# Patient Record
Sex: FEMALE | Race: WHITE | Employment: FULL TIME | URBAN - METROPOLITAN AREA
[De-identification: names, ages, dates, MRNs, and addresses within clinical notes are randomized per-mention and may not be internally consistent; named-entity substitution may affect disease eponyms.]

---

## 2016-08-31 LAB — TYPE, ABO & RH, EXTERNAL: NORMAL

## 2016-09-22 LAB
HBSAG, EXTERNAL: NORMAL
HIV, EXTERNAL: NORMAL
RPR, EXTERNAL: NORMAL
RUBELLA, EXTERNAL: NORMAL

## 2016-10-20 LAB
CHLAMYDIA, EXTERNAL: NORMAL
N. GONORRHEA, EXTERNAL: NORMAL

## 2017-01-10 ENCOUNTER — TELEPHONE (OUTPATIENT)
Dept: ENDOCRINOLOGY | Age: 32
End: 2017-01-10

## 2017-01-10 NOTE — TELEPHONE ENCOUNTER
----- Message from Maggie Russo sent at 1/10/2017  1:21 PM EST -----  Regarding: phone call  Patient called to ask a question about coming in to have her A1c checked. She would like you to call her back at:   [phone]. Thanks Linn Grider. ----    Pt called stating that she has two doctor's appt prior to her office visit with us on the same day and she wanted to know if she was having her A1c checked in our office. I informed her that it would be checked at her appt. during the visit. She  stated she was trying to see if she could come in that morning to have the results for her other doctor's scheduled that morning. I informed her that we could put her in as a lab visit appt. Pt changed her mind and stated that she would wait to do it all at the same time.

## 2017-01-26 ENCOUNTER — OFFICE VISIT (OUTPATIENT)
Dept: ENDOCRINOLOGY | Age: 32
End: 2017-01-26

## 2017-01-26 VITALS
BODY MASS INDEX: 26.91 KG/M2 | WEIGHT: 146.2 LBS | SYSTOLIC BLOOD PRESSURE: 102 MMHG | HEIGHT: 62 IN | DIASTOLIC BLOOD PRESSURE: 62 MMHG | HEART RATE: 90 BPM

## 2017-01-26 DIAGNOSIS — E78.5 HYPERLIPIDEMIA LDL GOAL <100: ICD-10-CM

## 2017-01-26 LAB — HBA1C MFR BLD HPLC: 6.8 %

## 2017-01-26 RX ORDER — INSULIN LISPRO 100 [IU]/ML
INJECTION, SOLUTION INTRAVENOUS; SUBCUTANEOUS
Qty: 1 PACKAGE | COMMUNITY
Start: 2017-01-26 | End: 2017-03-23 | Stop reason: SDUPTHER

## 2017-01-26 NOTE — PROGRESS NOTES
Chief Complaint   Patient presents with    Diabetes     pcp and pharmacy confirmed     History of Present Illness: Sharad Baltazar is a 36 y.o. female here for follow up of diabetes. Weight up 3 lbs since last visit in 12/16. Will be 25 weeks tomorrow with a boy. Had an ultrasound today with Dr. Yousif Hassan and said everything looks fine. She continues to e-mail me her readings weekly and she has increased her toujeo up to 46 units as of yesterday. The majority of her readings are still in the  range but she does have some spikes in the 150-210 range especially when eating larger carb meals. We discussed that she should try not to eat more than 60 grams of carbs with a meal to try and limit the amount of insulin she needs to prevent the baby from growing too large. I also advised her to stop eating a honey bun at bedtime and trying to take humalog for this as this is keeping her fasting sugars higher. Current Outpatient Prescriptions   Medication Sig    CALCIUM PO Take  by mouth daily.  insulin glargine (TOUJEO SOLOSTAR) 300 unit/mL (1.5 mL) inpn 46 Units by SubCUTAneous route daily. Dose change 1/16/17--updated med list--did not send prescription to the pharmacy    PNV95/FERROUS FUMARATE/FA (PRENATAL PO) Take  by mouth.  Insulin Needles, Disposable, (BD INSULIN PEN NEEDLE UF MINI) 31 gauge x 3/16\" ndle Use as directed 4 times daily    ONETOUCH ULTRA TEST strip Test 4 times daily  Dx: E10.65    ONETOUCH ULTRAMINI monitoring kit Use as directed: E10.65    insulin lispro (HUMALOG) 100 unit/mL kwikpen by SubCUTAneous route. 1 units per 5 grams of carbs for breakfast and lunch and dinner + 1 units for every 50 mg/dl above 130 mg/dl--Dose change 1/11/17--updated med list--did not send prescription to the pharmacy     No current facility-administered medications for this visit.       Allergies   Allergen Reactions    Ciprofloxacin Hives     Review of Systems:  - Eyes: no blurry vision or double vision  - Cardiovascular: no chest pain  - Respiratory: no shortness of breath  - Musculoskeletal: no myalgias  - Neurological: no numbness/tingling in extremities    Physical Examination:  Blood pressure 102/62, pulse 90, height 5' 2\" (1.575 m), weight 146 lb 3.2 oz (66.3 kg). - General: pleasant, no distress, good eye contact   - Neck: no carotid bruits  - Cardiovascular: regular, normal rate, nl s1 and s2, no m/r/g,   - Respiratory: clear bilaterally  - Integumentary: no edema,   - Psychiatric: normal mood and affect    Data Reviewed:   Component      Latest Ref Rng & Units 1/26/2017           2:52 PM   Hemoglobin A1c (POC)      % 6.8       Assessment/Plan:     1. Type I (juvenile type) diabetes mellitus without mention of complication, uncontrolled ([facility]): her most recent Hgb A1c was 6.8% in 1/17 up from 6.6% in 12/16 down from 6.8% in 11/16 down from 7.5% in 10/16 down from 7.9% in 9/16 up from 7.6% in 8/16 down from 8.3% in 3/16 up from 7.8% in 10/15 down from 8.2% in 6/15 up from 6.8% in 3/15. We spent 15 minutes of face to face time together and > 50% of the time was spent in counseling on diabetes management and determining what changes to make to her insulin regimen. Blood sugars are running higher so will be more aggressive with her carb dosing and her correction. - cont toujeo 46 units daily  - increase humalog to 1:4 for carbs and 1:40 > 130 for correction during the day and > 150 at bedtime  - check bs 4 times per day due to fluctuating sugars  - foot exam done 8/16  - optho UTD 6/15  - TSH normal 6/15  - microalbumin nl 8/16  - check A1c at next visit  - her BP was at goal < 140/90        2. Hyperlipidemia: Given DM, Goal LDL < 100, non-HDL < 130, and TG < 150.  in 6/15 down to 129 in 3/16. Will not pursue any meds while trying to conceive  - diet control  - check lipids when she is no longer pregnant    Patient Instructions   1) Stay on 55 of toujeo for now.     2) Dose your humalog at 1 unit for 4 grams of carbs per meal.  Try not to eat more than 60 grams per meal.    3) If your sugar is over 130, take 1 unit for 40 points over 130 to correct. 4) Keep sending me readings. 5) Your Hemoglobin A1c (3 month test of blood sugar) is still quite good at 6.8%.           Follow-up Disposition:  Return for 2/23/17 at 2:30pm.    Copy sent to:  Dr. Melanie Acevedo

## 2017-01-26 NOTE — MR AVS SNAPSHOT
Visit Information Date & Time Provider Department Dept. Phone Encounter #  
 1/26/2017  2:30 PM Colvin Gaucher, [address] [place] [address] Diabetes and Endocrinology [phone] [id number] Follow-up Instructions Return for 2/23/17 at 2:30pm. Upcoming Health Maintenance Date Due Pneumococcal 19-64 Medium Risk (1 of 1 - PPSV23) 8/10/2006 DTaP/Tdap/Td series (1 - Tdap) 8/10/2008 PAP AKA CERVICAL CYTOLOGY 8/10/2008 INFLUENZA AGE 9 TO ADULT 8/1/2016 EYE EXAM RETINAL OR DILATED Q1 2/27/2017 LIPID PANEL Q1 3/17/2017 HEMOGLOBIN A1C Q6M 6/27/2017 MICROALBUMIN Q1 8/25/2017 FOOT EXAM Q1 8/29/2017 Allergies as of 1/26/2017  Review Complete On: 1/26/2017 By: Colvin Gaucher, MD  
  
 Severity Noted Reaction Type Reactions Ciprofloxacin  06/22/2015    Hives Current Immunizations  Never Reviewed No immunizations on file. Not reviewed this visit You Were Diagnosed With   
  
 Codes Comments Uncontrolled type 1 diabetes mellitus without complication (HCC)    -  Primary ICD-10-CM: E10.9 ICD-9-CM: 250.03 Hyperlipidemia LDL goal <100     ICD-10-CM: E78.5 ICD-9-CM: 272.4 Vitals BP Pulse Height(growth percentile) Weight(growth percentile) BMI Smoking Status 102/62 (BP 1 Location: Left arm, BP Patient Position: Sitting) 90 5' 2\" (1.575 m) 146 lb 3.2 oz (66.3 kg) 26.74 kg/m2 Never Smoker Vitals History BMI and BSA Data Body Mass Index Body Surface Area  
 26.74 kg/m 2 1.7 m 2 Preferred Pharmacy Pharmacy Name Phone [id number] [place] [phone] Your Updated Medication List  
  
   
This list is accurate as of: 1/26/17  3:20 PM.  Always use your most recent med list.  
  
  
  
  
 CALCIUM PO Take  by mouth daily. insulin lispro 100 unit/mL kwikpen Commonly known as:  HUMALOG 1 units per 4 grams of carbs for breakfast and lunch and dinner + 1 units for every 40 mg/dl above 130 mg/dl--Dose change 1/26/17--updated med list--did not send prescription to the pharmacy Insulin Needles (Disposable) 31 gauge x 3/16\" Ndle Commonly known as:  BD INSULIN PEN NEEDLE UF MINI Use as directed 4 times daily ONETOUCH ULTRA TEST strip Generic drug:  glucose blood VI test strips Test 4 times daily  Dx: E10.65 ONETOUCH ULTRAMINI monitoring kit Generic drug:  Blood-Glucose Meter Use as directed: E10.65 PRENATAL PO Take  by mouth. TOUJEO SOLOSTAR 300 unit/mL (1.5 mL) Inpn Generic drug:  insulin glargine 46 Units by SubCUTAneous route daily. Dose change 1/16/17--updated med list--did not send prescription to the pharmacy We Performed the Following AMB POC HEMOGLOBIN A1C [89296 CPT(R)] Follow-up Instructions Return for 2/23/17 at 2:30pm.  
  
  
Patient Instructions 1) Stay on 55 of toujeo for now. 2) Dose your humalog at 1 unit for 4 grams of carbs per meal.  Try not to eat more than 60 grams per meal. 
 
3) If your sugar is over 130, take 1 unit for 40 points over 130 to correct. 4) Keep sending me readings. 5) Your Hemoglobin A1c (3 month test of blood sugar) is still quite good at 6.8%. Introducing [facility] & HEALTH SERVICES! Dear Shiloh Pro: Thank you for requesting a ROCKI account. Our records indicate that you already have an active ROCKI account. You can access your account anytime at [email or web address] Did you know that you can access your hospital and ER discharge instructions at any time in ROCKI? You can also review all of your test results from your hospital stay or ER visit. Additional Information If you have questions, please visit the Frequently Asked Questions section of the ROCKI website at [email or web address] Remember, MyChart is NOT to be used for urgent needs. For medical emergencies, dial 911. Now available from your iPhone and Android! Please provide this summary of care documentation to your next provider. Your primary care clinician is listed as [address] [place] [address]. If you have any questions after today's visit, please call [phone].

## 2017-01-26 NOTE — PATIENT INSTRUCTIONS
1) Stay on 55 of toujeo for now. 2) Dose your humalog at 1 unit for 4 grams of carbs per meal.  Try not to eat more than 60 grams per meal.    3) If your sugar is over 130, take 1 unit for 40 points over 130 to correct. 4) Keep sending me readings. 5) Your Hemoglobin A1c (3 month test of blood sugar) is still quite good at 6.8%.

## 2017-02-06 ENCOUNTER — TELEPHONE (OUTPATIENT)
Dept: ENDOCRINOLOGY | Age: 32
End: 2017-02-06

## 2017-02-06 NOTE — TELEPHONE ENCOUNTER
----- Message from Álvaro Mcmullen sent at 2/6/2017  2:32 PM EST -----  Rescheduled patient's appointment to 10:30 AM on 2/23/17. Thank you so much.      ----- Message -----     From: Colvin Gaucher, MD     Sent: 2/6/2017   1:09 PM       To: Álvaro Mcmullen    Please reschedule her appt from 2:30pm to 10:30am on 2/23/17. She has confirmed over [employer].

## 2017-02-23 ENCOUNTER — OFFICE VISIT (OUTPATIENT)
Dept: ENDOCRINOLOGY | Age: 32
End: 2017-02-23

## 2017-02-23 VITALS
BODY MASS INDEX: 27.86 KG/M2 | HEART RATE: 99 BPM | SYSTOLIC BLOOD PRESSURE: 98 MMHG | WEIGHT: 151.4 LBS | DIASTOLIC BLOOD PRESSURE: 67 MMHG | HEIGHT: 62 IN

## 2017-02-23 DIAGNOSIS — E78.5 HYPERLIPIDEMIA LDL GOAL <100: ICD-10-CM

## 2017-02-23 LAB — HBA1C MFR BLD HPLC: 6.8 %

## 2017-02-23 NOTE — PROGRESS NOTES
Chief Complaint   Patient presents with    Diabetes     pcp and pharmacy confirmed     History of Present Illness: John Griffin is a 36 y.o. female here for follow up of diabetes. Weight up 5 lbs since last visit in 1/17. Has been working on her total carb intake and trying to stick to 60 grams or less per meal but doesn't always do this and this is when she still spikes in the 130-180 range. Some weeks this can happen 5-6 times which is still keeping her sugars higher than I would like. Went to 48 units of toujeo last week and this is keeping her fasting sugars close to 100 or less. When watching carbs closely can keep her readings between  during the day. Currently 29 weeks pregnant with a boy. Current Outpatient Prescriptions   Medication Sig    insulin glargine (TOUJEO SOLOSTAR) 300 unit/mL (1.5 mL) inpn 48 Units by SubCUTAneous route daily. And Increase as directed up to a max of 70 units per day--Dose change 2/14/17--updated med list--did not send prescription to the pharmacy    CALCIUM PO Take  by mouth daily.  insulin lispro (HUMALOG) 100 unit/mL kwikpen 1 units per 4 grams of carbs for breakfast and lunch and dinner + 1 units for every 40 mg/dl above 130 mg/dl--Dose change 1/26/17--updated med list--did not send prescription to the pharmacy    PNV95/FERROUS FUMARATE/FA (PRENATAL PO) Take  by mouth.  Insulin Needles, Disposable, (BD INSULIN PEN NEEDLE UF MINI) 31 gauge x 3/16\" ndle Use as directed 4 times daily    ONETOUCH ULTRA TEST strip Test 4 times daily  Dx: E10.65    ONETOUCH ULTRAMINI monitoring kit Use as directed: E10.65     No current facility-administered medications for this visit.       Allergies   Allergen Reactions    Ciprofloxacin Hives     Review of Systems:  - Eyes: no blurry vision or double vision  - Cardiovascular: no chest pain  - Respiratory: no shortness of breath  - Musculoskeletal: no myalgias  - Neurological: no numbness/tingling in extremities    Physical Examination:  Blood pressure 98/67, pulse 99, height 5' 2\" (1.575 m), weight 151 lb 6.4 oz (68.7 kg). - General: pleasant, no distress, good eye contact   - Neck: no carotid bruits  - Cardiovascular: regular, normal rate, nl s1 and s2, no m/r/g,   - Respiratory: clear bilaterally  - Integumentary: no edema,   - Psychiatric: normal mood and affect    Data Reviewed:   Component      Latest Ref Rng & Units 2/23/2017          10:56 AM   Hemoglobin A1c (POC)      % 6.8       Assessment/Plan:     1. Type I (juvenile type) diabetes mellitus without mention of complication, uncontrolled ([facility] [place]): her most recent Hgb A1c was 6.8% in 2/17 stable from 1/17 up from 6.6% in 12/16 down from 6.8% in 11/16 down from 7.5% in 10/16 down from 7.9% in 9/16 up from 7.6% in 8/16 down from 8.3% in 3/16 up from 7.8% in 10/15 down from 8.2% in 6/15 up from 6.8% in 3/15. We spent 15 minutes of face to face time together and > 50% of the time was spent in counseling on diabetes management and determining what changes to make to her insulin regimen. Blood sugars are stable so won't make any changes today but likely will need an increase in her toujeo in the coming weeks. - cont toujeo 48 units daily  - cont humalog 1:4 for carbs and 1:40 > 130 for correction during the day and > 150 at bedtime  - check bs 4 times per day due to fluctuating sugars  - foot exam done 8/16  - optho UTD 6/15  - TSH normal 6/15  - microalbumin nl 8/16  - check A1c at next visit  - her BP was at goal < 140/90        2. Hyperlipidemia: Given DM, Goal LDL < 100, non-HDL < 130, and TG < 150.  in 6/15 down to 129 in 3/16. Will not pursue any meds while trying to conceive  - diet control  - check lipids when she is no longer pregnant    Patient Instructions   1) Your Hemoglobin A1c (3 month test of blood sugar) is still very good at 6.8%. Keep doing your best to keep your carb intake to 60 grams or less.   If you are seeing spikes after certain meals, think about the possibility of undercounting your carbs.         Follow-up Disposition:  Return for 3/23/17 at 10:30am.    Copy sent to:  Dr. Wander Holley Settler  Dr. Eddie Johnson

## 2017-02-23 NOTE — PATIENT INSTRUCTIONS
1) Your Hemoglobin A1c (3 month test of blood sugar) is still very good at 6.8%. Keep doing your best to keep your carb intake to 60 grams or less. If you are seeing spikes after certain meals, think about the possibility of undercounting your carbs.

## 2017-02-23 NOTE — MR AVS SNAPSHOT
Visit Information Date & Time Provider Department Dept. Phone Encounter #  
 2/23/2017 10:30 AM Aleksey Steel, [address] [place] [address] Diabetes and Endocrinology [phone] [id number] Follow-up Instructions Return for 3/23/17 at 10:30am. Upcoming Health Maintenance Date Due Pneumococcal 19-64 Medium Risk (1 of 1 - PPSV23) 8/10/2006 DTaP/Tdap/Td series (1 - Tdap) 8/10/2008 PAP AKA CERVICAL CYTOLOGY 8/10/2008 INFLUENZA AGE 9 TO ADULT 8/1/2016 EYE EXAM RETINAL OR DILATED Q1 2/27/2017 LIPID PANEL Q1 3/17/2017 HEMOGLOBIN A1C Q6M 7/26/2017 MICROALBUMIN Q1 8/25/2017 FOOT EXAM Q1 8/29/2017 Allergies as of 2/23/2017  Review Complete On: 2/23/2017 By: Aleksey Steel MD  
  
 Severity Noted Reaction Type Reactions Ciprofloxacin  06/22/2015    Hives Current Immunizations  Never Reviewed No immunizations on file. Not reviewed this visit You Were Diagnosed With   
  
 Codes Comments Uncontrolled type 1 diabetes mellitus without complication (HCC)    -  Primary ICD-10-CM: E10.9 ICD-9-CM: 250.03 Hyperlipidemia LDL goal <100     ICD-10-CM: E78.5 ICD-9-CM: 272.4 Vitals BP  
  
  
  
  
  
 98/67 (BP 1 Location: Left arm, BP Patient Position: Sitting) Vitals History BMI and BSA Data Body Mass Index Body Surface Area  
 27.69 kg/m 2 1.73 m 2 Preferred Pharmacy Pharmacy Name Phone [id number] [address] [phone] Your Updated Medication List  
  
   
This list is accurate as of: 2/23/17 11:11 AM.  Always use your most recent med list.  
  
  
  
  
 CALCIUM PO Take  by mouth daily. insulin lispro 100 unit/mL kwikpen Commonly known as:  HUMALOG  
1 units per 4 grams of carbs for breakfast and lunch and dinner + 1 units for every 40 mg/dl above 130 mg/dl--Dose change 1/26/17--updated med list--did not send prescription to the pharmacy Insulin Needles (Disposable) 31 gauge x 3/16\" Ndle Commonly known as:  BD INSULIN PEN NEEDLE UF MINI Use as directed 4 times daily ONETOUCH ULTRA TEST strip Generic drug:  glucose blood VI test strips Test 4 times daily  Dx: E10.65 ONETOUCH ULTRAMINI monitoring kit Generic drug:  Blood-Glucose Meter Use as directed: E10.65 PRENATAL PO Take  by mouth. TOUJEO SOLOSTAR 300 unit/mL (1.5 mL) Inpn Generic drug:  insulin glargine 48 Units by SubCUTAneous route daily. And Increase as directed up to a max of 70 units per day--Dose change 2/14/17--updated med list--did not send prescription to the pharmacy We Performed the Following AMB POC HEMOGLOBIN A1C [34901 CPT(R)] Follow-up Instructions Return for 3/23/17 at 10:30am.  
  
  
Patient Instructions 1) Your Hemoglobin A1c (3 month test of blood sugar) is still very good at 6.8%. Keep doing your best to keep your carb intake to 60 grams or less. If you are seeing spikes after certain meals, think about the possibility of undercounting your carbs. Introducing \[facility]\"" & HEALTH SERVICES! Dear Chevis Closs: Thank you for requesting a [employer] account. Our records indicate that you already have an active [employer] account. You can access your account anytime at [email or web address] Did you know that you can access your hospital and ER discharge instructions at any time in [employer]? You can also review all of your test results from your hospital stay or ER visit. Additional Information If you have questions, please visit the Frequently Asked Questions section of the [employer] website at [email or web address] Remember, [employer] is NOT to be used for urgent needs. For medical emergencies, dial 911. Now available from your iPhone and Android! Please provide this summary of care documentation to your next provider. Your primary care clinician is listed as [address] [facility]. If you have any questions after today's visit, please call [phone].

## 2017-03-02 ENCOUNTER — TELEPHONE (OUTPATIENT)
Dept: ENDOCRINOLOGY | Age: 32
End: 2017-03-02

## 2017-03-02 NOTE — TELEPHONE ENCOUNTER
Pt stated she saw her high risk doctor today and he suggested that her Toujeo should be increased. She stated that she takes 48 units at noon however  her doctor thinks she should take it first thing in the am or before bedtime. She stated that he told her that any increase should come from her endocrinologist. Ms. Rasta Romero will be sending sending her current log of her readings to be evaluated.

## 2017-03-02 NOTE — TELEPHONE ENCOUNTER
Patient is requesting a call back in regards to her follow up with her high risk doctor, she stated that she would like to go over her medications. She can be reached at [phone]. Thank you.        Ashwin Cazares

## 2017-03-02 NOTE — TELEPHONE ENCOUNTER
Called pt and discussed Dr. Quoc Galaviz recommendation to go up to 50 units of toujeo. I agree this is a reasonable plan. She asked about moving the time of toujeo but I explained that given the duration of action is 36 hours for toujeo, she does not need to move the time she is injecting. I asked her to update me again over mychart on Monday with her readings. she voiced understanding of this plan.

## 2017-03-23 ENCOUNTER — OFFICE VISIT (OUTPATIENT)
Dept: ENDOCRINOLOGY | Age: 32
End: 2017-03-23

## 2017-03-23 VITALS
DIASTOLIC BLOOD PRESSURE: 88 MMHG | HEART RATE: 96 BPM | SYSTOLIC BLOOD PRESSURE: 121 MMHG | BODY MASS INDEX: 28.37 KG/M2 | WEIGHT: 154.2 LBS | HEIGHT: 62 IN

## 2017-03-23 DIAGNOSIS — E78.5 HYPERLIPIDEMIA LDL GOAL <100: ICD-10-CM

## 2017-03-23 LAB — HBA1C MFR BLD HPLC: 6.4 %

## 2017-03-23 RX ORDER — INSULIN LISPRO 100 [IU]/ML
INJECTION, SOLUTION INTRAVENOUS; SUBCUTANEOUS
COMMUNITY
Start: 2017-03-23 | End: 2018-02-05 | Stop reason: SDUPTHER

## 2017-03-23 NOTE — PATIENT INSTRUCTIONS
1) Try 1 unit for 3 grams of carbs for breakfast starting tomorrow and give me an update on Monday. Keep checking 2 hours after you eat breakfast and some other 2 hour after meal readings just to make sure we are getting closer to 120-130 or less after meals. 2) Stay on toujeo 54 units for now. 3) Your Hemoglobin A1c (3 month test of blood sugar) was 6.4% which is the best it's been during pregnancy.

## 2017-03-23 NOTE — PROGRESS NOTES
Chief Complaint   Patient presents with    Diabetes     pcp and pharmacy confirmed     History of Present Illness: Brianna Kruger is a 36 y.o. female here for follow up of diabetes. Weight up 3 lbs since last visit in 2/17. Will be 33 weeks pregnant tomorrow with a boy. Has done much better with the 54 of toujeo having many more fasting sugars in the 50-80 range and a few up to around 100. She has been checking more after meal readings and is seeing spikes in the 140-160s with a few over 200 so we will be more aggressive with her carb ratio at breakfast.  Going for an eye exam next week. States Dr. Arzella Dance is thinking about inducing on 4/27/17. Current Outpatient Prescriptions   Medication Sig    insulin glargine (TOUJEO SOLOSTAR) 300 unit/mL (1.5 mL) inpn 54 Units by SubCUTAneous route daily. And Increase as directed up to a max of 70 units per day--Dose change 3/7/17-updated med list--did not send prescription to the pharmacy    CALCIUM PO Take  by mouth daily.  insulin lispro (HUMALOG) 100 unit/mL kwikpen 1 units per 4 grams of carbs for breakfast and lunch and dinner + 1 units for every 40 mg/dl above 130 mg/dl--Dose change 1/26/17--updated med list--did not send prescription to the pharmacy    PNV95/FERROUS FUMARATE/FA (PRENATAL PO) Take  by mouth.  Insulin Needles, Disposable, (BD INSULIN PEN NEEDLE UF MINI) 31 gauge x 3/16\" ndle Use as directed 4 times daily    ONETOUCH ULTRA TEST strip Test 4 times daily  Dx: E10.65    ONETOUCH ULTRAMINI monitoring kit Use as directed: E10.65     No current facility-administered medications for this visit. Allergies   Allergen Reactions    Ciprofloxacin Hives     Review of Systems: Not asked today    Physical Examination:  Blood pressure 121/88, pulse 96, height 5' 2\" (1.575 m), weight 154 lb 3.2 oz (69.9 kg).   - General: pleasant, no distress, good eye contact   - Full exam not performed  - Psychiatric: normal mood and affect    Data Reviewed: Component      Latest Ref Rng & Units 3/23/2017          11:22 AM   Hemoglobin A1c (POC)      % 6.4       Assessment/Plan:     1. Type I (juvenile type) diabetes mellitus without mention of complication, [facility] her most recent Hgb A1c was 6.4% in 3/17 down from 6.8% in 2/17 stable from 1/17 up from 6.6% in 12/16 down from 6.8% in 11/16 down from 7.5% in 10/16 down from 7.9% in 9/16 up from 7.6% in 8/16 down from 8.3% in 3/16 up from 7.8% in 10/15 down from 8.2% in 6/15 up from 6.8% in 3/15. We spent 15 minutes of face to face time together and > 50% of the time was spent in counseling on diabetes management and determining what changes to make to her insulin regimen. Blood sugars are improving but will make her carb ratio more aggressive at breakfast.  - cont toujeo 48 units daily  - take humalog 1:3 for carbs at breakfast and 1:4 for carbs at lunch and dinner and 1:40 > 130 for correction during the day and > 150 at bedtime  - check bs 4 times per day due to fluctuating sugars  - foot exam done 8/16  - optho UTD 6/15  - TSH normal 6/15  - microalbumin nl 8/16  - check A1c at next visit  - her BP was at goal < 140/90        2. Hyperlipidemia: Given DM, Goal LDL < 100, non-HDL < 130, and TG < 150.  in 6/15 down to 129 in 3/16. Will not pursue any meds while trying to conceive  - diet control  - check lipids when she is no longer pregnant    Patient Instructions   1) Try 1 unit for 3 grams of carbs for breakfast starting tomorrow and give me an update on Monday. Keep checking 2 hours after you eat breakfast and some other 2 hour after meal readings just to make sure we are getting closer to 120-130 or less after meals. 2) Stay on toujeo 54 units for now. 3) Your Hemoglobin A1c (3 month test of blood sugar) was 6.4% which is the best it's been during pregnancy.         Follow-up Disposition:  Return for 4/20/17 at 2:10pm.    Copy sent to:  Dr. Amador Bansal Love

## 2017-03-23 NOTE — MR AVS SNAPSHOT
Visit Information Date & Time Provider Department Dept. Phone Encounter #  
 3/23/2017 10:30 AM Janel Flores, [address] [place] [address] Diabetes and Endocrinology [phone] [id number] Follow-up Instructions Return for 4/20/17 at 2:10pm. Upcoming Health Maintenance Date Due Pneumococcal 19-64 Medium Risk (1 of 1 - PPSV23) 8/10/2006 DTaP/Tdap/Td series (1 - Tdap) 8/10/2008 PAP AKA CERVICAL CYTOLOGY 8/10/2008 INFLUENZA AGE 9 TO ADULT 8/1/2016 EYE EXAM RETINAL OR DILATED Q1 2/27/2017 LIPID PANEL Q1 3/17/2017 HEMOGLOBIN A1C Q6M 8/23/2017 MICROALBUMIN Q1 8/25/2017 FOOT EXAM Q1 8/29/2017 Allergies as of 3/23/2017  Review Complete On: 3/23/2017 By: Janel Flores MD  
  
 Severity Noted Reaction Type Reactions Ciprofloxacin  06/22/2015    Hives Current Immunizations  Never Reviewed No immunizations on file. Not reviewed this visit You Were Diagnosed With   
  
 Codes Comments Uncontrolled type 1 diabetes mellitus without complication (HCC)    -  Primary ICD-10-CM: E10.9 ICD-9-CM: 250.03 Vitals BP Pulse Height(growth percentile) Weight(growth percentile) BMI Smoking Status 121/88 (BP 1 Location: Left arm, BP Patient Position: Sitting) 96 5' 2\" (1.575 m) 154 lb 3.2 oz (69.9 kg) 28.2 kg/m2 Never Smoker BMI and BSA Data Body Mass Index Body Surface Area  
 28.2 kg/m 2 1.75 m 2 Preferred Pharmacy Pharmacy Name Phone [id number], [place] [phone] Your Updated Medication List  
  
   
This list is accurate as of: 3/23/17 11:37 AM.  Always use your most recent med list.  
  
  
  
  
 CALCIUM PO Take  by mouth daily. insulin lispro 100 unit/mL kwikpen Commonly known as:  HUMALOG  
1 units per 4 grams of carbs for breakfast and lunch and dinner + 1 units for every 40 mg/dl above 130 mg/dl--Dose change 1/26/17--updated med list--did not send prescription to the pharmacy Insulin Needles (Disposable) 31 gauge x 3/16\" Ndle Commonly known as:  BD INSULIN PEN NEEDLE UF MINI Use as directed 4 times daily ONETOUCH ULTRA TEST strip Generic drug:  glucose blood VI test strips Test 4 times daily  Dx: E10.65 ONETOUCH ULTRAMINI monitoring kit Generic drug:  Blood-Glucose Meter Use as directed: E10.65 PRENATAL PO Take  by mouth. TOUJEO SOLOSTAR 300 unit/mL (1.5 mL) Inpn Generic drug:  insulin glargine 54 Units by SubCUTAneous route daily. And Increase as directed up to a max of 70 units per day--Dose change 3/7/17-updated med list--did not send prescription to the pharmacy We Performed the Following AMB POC HEMOGLOBIN A1C [42656 CPT(R)] Follow-up Instructions Return for 4/20/17 at 2:10pm.  
  
  
Patient Instructions 1) Try 1 unit for 3 grams of carbs for breakfast starting tomorrow and give me an update on Monday. Keep checking 2 hours after you eat breakfast and some other 2 hour after meal readings just to make sure we are getting closer to 120-130 or less after meals. 2) Stay on toujeo 54 units for now. 3) Your Hemoglobin A1c (3 month test of blood sugar) was 6.4% which is the best it's been during pregnancy. [facility] & HEALTH SERVICES! Dear Ed Bell: Thank you for requesting a [employer] account. Our records indicate that you already have an active [employer] account. You can access your account anytime at [email or web address] Did you know that you can access your hospital and ER discharge instructions at any time in [employer]? You can also review all of your test results from your hospital stay or ER visit. Additional Information If you have questions, please visit the Frequently Asked Questions section of the [employer] website at [email or web address] Remember, MyChart is NOT to be used for urgent needs. For medical emergencies, dial 911. Now available from your iPhone and Android! Please provide this summary of care documentation to your next provider. Your primary care clinician is listed as [address] [place] [address]. If you have any questions after today's visit, please call [phone].

## 2017-04-20 ENCOUNTER — OFFICE VISIT (OUTPATIENT)
Dept: ENDOCRINOLOGY | Age: 32
End: 2017-04-20

## 2017-04-20 VITALS
BODY MASS INDEX: 30.4 KG/M2 | HEART RATE: 90 BPM | SYSTOLIC BLOOD PRESSURE: 125 MMHG | DIASTOLIC BLOOD PRESSURE: 79 MMHG | HEIGHT: 62 IN | WEIGHT: 165.2 LBS

## 2017-04-20 DIAGNOSIS — E78.5 HYPERLIPIDEMIA LDL GOAL <100: ICD-10-CM

## 2017-04-20 LAB — HBA1C MFR BLD HPLC: 6.1 %

## 2017-04-20 NOTE — MR AVS SNAPSHOT
Visit Information Date & Time Provider Department Dept. Phone Encounter #  
 4/20/2017  2:10 PM Miladis Linares [address] Diabetes and Endocrinology [phone] Follow-up Instructions Return for 8/3/17 at 1:30pm. Upcoming Health Maintenance Date Due Pneumococcal 19-64 Medium Risk (1 of 1 - PPSV23) 8/10/2006 DTaP/Tdap/Td series (1 - Tdap) 8/10/2008 PAP AKA CERVICAL CYTOLOGY 8/10/2008 INFLUENZA AGE 9 TO ADULT 8/1/2016 LIPID PANEL Q1 3/17/2017 MICROALBUMIN Q1 8/25/2017 FOOT EXAM Q1 8/29/2017 HEMOGLOBIN A1C Q6M 9/23/2017 EYE EXAM RETINAL OR DILATED Q1 3/28/2018 Allergies as of 4/20/2017  Review Complete On: 4/20/2017 By: Tiny Abarca MD  
  
 Severity Noted Reaction Type Reactions Ciprofloxacin  06/22/2015    Hives Current Immunizations  Never Reviewed No immunizations on file. Not reviewed this visit You Were Diagnosed With   
  
 Codes Comments Uncontrolled type 1 diabetes mellitus without complication (HCC)    -  Primary ICD-10-CM: E10.9 ICD-9-CM: 250.03 Hyperlipidemia LDL goal <100     ICD-10-CM: E78.5 ICD-9-CM: 272.4 Vitals BP Pulse Height(growth percentile) Weight(growth percentile) BMI Smoking Status 125/79 (BP 1 Location: Left arm, BP Patient Position: Sitting) 90 5' 2\" (1.575 m) 165 lb 3.2 oz (74.9 kg) 30.22 kg/m2 Never Smoker BMI and BSA Data Body Mass Index Body Surface Area  
 30.22 kg/m 2 1.81 m 2 Preferred Pharmacy Pharmacy Name Phone [id number] [place] [phone] Your Updated Medication List  
  
   
This list is accurate as of: 4/20/17  3:12 PM.  Always use your most recent med list.  
  
  
  
  
 CALCIUM PO Take  by mouth daily. insulin lispro 100 unit/mL kwikpen Commonly known as:  HUMALOG  
1 units per 3 grams of carbs for breakfast and 1 unit for 4 grams of carbs at lunch and dinner + 1 units for every 40 mg/dl above 130 mg/dl--Dose change 3/23/17--updated med list--did not send prescription to the pharmacy Insulin Needles (Disposable) 31 gauge x 3/16\" Ndle Commonly known as:  BD INSULIN PEN NEEDLE UF MINI Use as directed 4 times daily ONETOUCH ULTRA TEST strip Generic drug:  glucose blood VI test strips Test 4 times daily  Dx: E10.65 ONETOUCH ULTRAMINI monitoring kit Generic drug:  Blood-Glucose Meter Use as directed: E10.65 PRENATAL PO Take  by mouth. TOUJEO SOLOSTAR 300 unit/mL (1.5 mL) Inpn Generic drug:  insulin glargine 54 Units by SubCUTAneous route daily. And Increase as directed up to a max of 70 units per day--Dose change 3/7/17-updated med list--did not send prescription to the pharmacy We Performed the Following AMB POC HEMOGLOBIN A1C [75289 CPT(R)] Follow-up Instructions Return for 8/3/17 at 1:30pm.  
  
  
Patient Instructions 1) Your Hemoglobin A1c (3 month test of blood sugar) is excellent at 6.1% and is the best it's been during pregnancy. 2) Keep sending me readings until you deliver. 3) After you deliver, go back to 30 units of toujeo daily and humalog 1:10 for carbs and 1:50 > 130 for correction as a starting dose and send me readings after delivery and we can adjust from there. Introducing [facility]! Dear Tom Lind: Thank you for requesting a [employer] account. Our records indicate that you already have an active [employer] account. You can access your account anytime at [email or web address] Did you know that you can access your hospital and ER discharge instructions at any time in [employer]? You can also review all of your test results from your hospital stay or ER visit. Additional Information If you have questions, please visit the Frequently Asked Questions section of the [email or web address] website at [email or web address] Remember, [email or web address] is NOT to be used for urgent needs. For medical emergencies, dial 911. Now available from your iPhone and Android! Please provide this summary of care documentation to your next provider. Your primary care clinician is listed as [address] [place] [address]. If you have any questions after today's visit, please call [phone].

## 2017-04-20 NOTE — PROGRESS NOTES
Chief Complaint   Patient presents with    Diabetes     pcp and pharmacy confirmed     History of Present Illness: Natalya Storey is a 36 y.o. female here for follow up of diabetes. Will be 40 weeks pregnant tomorrow with her son and plan is for induction on 5/3/17 unless it's moved up by Dr. Ascencion Jackson. She and I have continued to titrate her insulin and has found that 1:3 for breakfast has worked well and she is no longer spiking up over 130 after this meal.  Fasting sugars are mostly in the 70-90 range and during the day are all under 130 aside from a few readings in the 140-160s 1-2 times a week at most.    Current Outpatient Prescriptions   Medication Sig    insulin lispro (HUMALOG) 100 unit/mL kwikpen 1 units per 3 grams of carbs for breakfast and 1 unit for 4 grams of carbs at lunch and dinner + 1 units for every 40 mg/dl above 130 mg/dl--Dose change 3/23/17--updated med list--did not send prescription to the pharmacy    insulin glargine (TOUJEO SOLOSTAR) 300 unit/mL (1.5 mL) inpn 54 Units by SubCUTAneous route daily. And Increase as directed up to a max of 70 units per day--Dose change 3/7/17-updated med list--did not send prescription to the pharmacy    CALCIUM PO Take  by mouth daily.  PNV95/FERROUS FUMARATE/FA (PRENATAL PO) Take  by mouth.  Insulin Needles, Disposable, (BD INSULIN PEN NEEDLE UF MINI) 31 gauge x 3/16\" ndle Use as directed 4 times daily    ONETOUCH ULTRA TEST strip Test 4 times daily  Dx: E10.65    ONETOUCH ULTRAMINI monitoring kit Use as directed: E10.65     No current facility-administered medications for this visit.       Allergies   Allergen Reactions    Ciprofloxacin Hives     Review of Systems:  - Eyes: no blurry vision or double vision  - Cardiovascular: no chest pain  - Respiratory: no shortness of breath  - Musculoskeletal: no myalgias  - Neurological: no numbness/tingling in extremities    Physical Examination:  Blood pressure 125/79, pulse 90, height 5' 2\" (1.575 m), weight 165 lb 3.2 oz (74.9 kg). - General: pleasant, no distress, good eye contact   - Neck: no carotid bruits  - Cardiovascular: regular, normal rate, nl s1 and s2, no m/r/g,   - Respiratory: clear bilaterally  - Integumentary: no edema,   - Psychiatric: normal mood and affect    Data Reviewed:   Component      Latest Ref Rng & Units 4/20/2017           2:59 PM   Hemoglobin A1c (POC)      % 6.1       Assessment/Plan:     1. Type I (juvenile type) diabetes mellitus without mention of complication, uncontrolled ([facility] [address]): her most recent Hgb A1c was 6.1% in 4/17 down from 6.4% in 3/17 down from 6.8% in 2/17 stable from 1/17 up from 6.6% in 12/16 down from 6.8% in 11/16 down from 7.5% in 10/16 down from 7.9% in 9/16 up from 7.6% in 8/16 down from 8.3% in 3/16 up from 7.8% in 10/15 down from 8.2% in 6/15 up from 6.8% in 3/15. We spent 15 minutes of face to face time together and > 50% of the time was spent in counseling on diabetes management and determining what changes to make to her insulin regimen. Blood sugars are excellent so will not make any changes at this time. Discussed what her regimen will be after delivery and that likely she will not need any insulin the 24-48 hours after delivery. - cont toujeo 54 units daily--decrease to 30 units once she delivers  - take humalog 1:3 for carbs at breakfast and 1:4 for carbs at lunch and dinner and 1:40 > 130 for correction during the day and > 150 at bedtime--decrease to 1:10 for carbs and 1:50> 130 once she delivers  - check bs 4 times per day due to fluctuating sugars  - foot exam done 8/16  - optho UTD 6/15  - TSH normal 6/15  - microalbumin nl 8/16  - check A1c at next visit  - her BP was at goal < 140/90        2. Hyperlipidemia: Given DM, Goal LDL < 100, non-HDL < 130, and TG < 150.  in 6/15 down to 129 in 3/16.   Will not pursue any meds while trying to conceive  - diet control  - check lipids at next visit    Patient Instructions   1) Your Hemoglobin A1c (3 month test of blood sugar) is excellent at 6.1% and is the best it's been during pregnancy. 2) Keep sending me readings until you deliver. 3) After you deliver, go back to 30 units of toujeo daily and humalog 1:10 for carbs and 1:50 > 130 for correction as a starting dose and send me readings after delivery and we can adjust from there.         Follow-up Disposition:  Return for 8/3/17 at 1:30pm.    Copy sent to:  Dr. Tommy Bethea

## 2017-04-20 NOTE — PATIENT INSTRUCTIONS
1) Your Hemoglobin A1c (3 month test of blood sugar) is excellent at 6.1% and is the best it's been during pregnancy. 2) Keep sending me readings until you deliver. 3) After you deliver, go back to 30 units of toujeo daily and humalog 1:10 for carbs and 1:50 > 130 for correction as a starting dose and send me readings after delivery and we can adjust from there.

## 2017-04-28 ENCOUNTER — HOSPITAL ENCOUNTER (INPATIENT)
Age: 32
LOS: 3 days | Discharge: HOME | End: 2017-05-01
Attending: OBSTETRICS & GYNECOLOGY | Admitting: SPECIALIST
Payer: COMMERCIAL

## 2017-04-28 ENCOUNTER — ANESTHESIA (OUTPATIENT)
Dept: LABOR AND DELIVERY | Age: 32
End: 2017-04-28
Payer: COMMERCIAL

## 2017-04-28 ENCOUNTER — ANESTHESIA EVENT (OUTPATIENT)
Dept: LABOR AND DELIVERY | Age: 32
End: 2017-04-28
Payer: COMMERCIAL

## 2017-04-28 PROBLEM — O42.90 PREMATURE RUPTURE OF MEMBRANES IN PREGNANCY: Status: ACTIVE | Noted: 2017-04-28

## 2017-04-28 LAB
ABO + RH BLD: NORMAL
ALBUMIN SERPL BCP-MCNC: 2.4 G/DL (ref 3.5–5)
ALBUMIN/GLOB SERPL: 0.8 {RATIO} (ref 1.1–2.2)
ALP SERPL-CCNC: 165 U/L (ref 45–117)
ALT SERPL-CCNC: 17 U/L (ref 12–78)
ANION GAP BLD CALC-SCNC: 12 MMOL/L (ref 5–15)
AST SERPL W P-5'-P-CCNC: 24 U/L (ref 15–37)
BILIRUB SERPL-MCNC: 0.6 MG/DL (ref 0.2–1)
BLOOD GROUP ANTIBODIES SERPL: NORMAL
BUN SERPL-MCNC: 9 MG/DL (ref 6–20)
BUN/CREAT SERPL: 15 (ref 12–20)
CALCIUM SERPL-MCNC: 8.9 MG/DL (ref 8.5–10.1)
CHLORIDE SERPL-SCNC: 107 MMOL/L (ref 97–108)
CO2 SERPL-SCNC: 20 MMOL/L (ref 21–32)
CREAT SERPL-MCNC: 0.59 MG/DL (ref 0.55–1.02)
ERYTHROCYTE [DISTWIDTH] IN BLOOD BY AUTOMATED COUNT: 13.1 % (ref 11.5–14.5)
GLOBULIN SER CALC-MCNC: 3.1 G/DL (ref 2–4)
GLUCOSE BLD STRIP.AUTO-MCNC: 112 MG/DL (ref 65–100)
GLUCOSE BLD STRIP.AUTO-MCNC: 117 MG/DL (ref 65–100)
GLUCOSE BLD STRIP.AUTO-MCNC: 127 MG/DL (ref 65–100)
GLUCOSE BLD STRIP.AUTO-MCNC: 167 MG/DL (ref 65–100)
GLUCOSE BLD STRIP.AUTO-MCNC: 83 MG/DL (ref 65–100)
GLUCOSE BLD STRIP.AUTO-MCNC: 98 MG/DL (ref 65–100)
GLUCOSE SERPL-MCNC: 161 MG/DL (ref 65–100)
HCT VFR BLD AUTO: 37.9 % (ref 35–47)
HGB BLD-MCNC: 13.1 G/DL (ref 11.5–16)
MCH RBC QN AUTO: 30.4 PG (ref 26–34)
MCHC RBC AUTO-ENTMCNC: 34.6 G/DL (ref 30–36.5)
MCV RBC AUTO: 87.9 FL (ref 80–99)
PLATELET # BLD AUTO: 201 K/UL (ref 150–400)
POTASSIUM SERPL-SCNC: 4.1 MMOL/L (ref 3.5–5.1)
PROT SERPL-MCNC: 5.5 G/DL (ref 6.4–8.2)
RBC # BLD AUTO: 4.31 M/UL (ref 3.8–5.2)
SERVICE CMNT-IMP: ABNORMAL
SERVICE CMNT-IMP: NORMAL
SERVICE CMNT-IMP: NORMAL
SODIUM SERPL-SCNC: 139 MMOL/L (ref 136–145)
SPECIMEN EXP DATE BLD: NORMAL
WBC # BLD AUTO: 8.8 K/UL (ref 3.6–11)

## 2017-04-28 PROCEDURE — 77030008459 HC STPLR SKN COOP -B

## 2017-04-28 PROCEDURE — 77030031139 HC SUT VCRL2 J&J -A

## 2017-04-28 PROCEDURE — 80053 COMPREHEN METABOLIC PANEL: CPT | Performed by: SPECIALIST

## 2017-04-28 PROCEDURE — 77030011640 HC PAD GRND REM COVD -A

## 2017-04-28 PROCEDURE — 85027 COMPLETE CBC AUTOMATED: CPT | Performed by: SPECIALIST

## 2017-04-28 PROCEDURE — 88307 TISSUE EXAM BY PATHOLOGIST: CPT | Performed by: OBSTETRICS & GYNECOLOGY

## 2017-04-28 PROCEDURE — 36415 COLL VENOUS BLD VENIPUNCTURE: CPT | Performed by: SPECIALIST

## 2017-04-28 PROCEDURE — 74011000250 HC RX REV CODE- 250

## 2017-04-28 PROCEDURE — 74011000250 HC RX REV CODE- 250: Performed by: OBSTETRICS & GYNECOLOGY

## 2017-04-28 PROCEDURE — 77030034849

## 2017-04-28 PROCEDURE — 76060000078 HC EPIDURAL ANESTHESIA: Performed by: OBSTETRICS & GYNECOLOGY

## 2017-04-28 PROCEDURE — 74011250636 HC RX REV CODE- 250/636: Performed by: ANESTHESIOLOGY

## 2017-04-28 PROCEDURE — 77030002933 HC SUT MCRYL J&J -A

## 2017-04-28 PROCEDURE — 77030032060 HC PWDR HEMSTAT ARISTA ASRB 3GM BARD -C

## 2017-04-28 PROCEDURE — A4300 CATH IMPL VASC ACCESS PORTAL: HCPCS

## 2017-04-28 PROCEDURE — 74011250636 HC RX REV CODE- 250/636: Performed by: OBSTETRICS & GYNECOLOGY

## 2017-04-28 PROCEDURE — 99283 EMERGENCY DEPT VISIT LOW MDM: CPT

## 2017-04-28 PROCEDURE — 77030032490 HC SLV COMPR SCD KNE COVD -B

## 2017-04-28 PROCEDURE — 74011250636 HC RX REV CODE- 250/636

## 2017-04-28 PROCEDURE — 74011250636 HC RX REV CODE- 250/636: Performed by: SPECIALIST

## 2017-04-28 PROCEDURE — 77030012890

## 2017-04-28 PROCEDURE — 75410000003 HC RECOV DEL/VAG/CSECN EA 0.5 HR: Performed by: OBSTETRICS & GYNECOLOGY

## 2017-04-28 PROCEDURE — 77030007880 HC KT SPN EPDRL BBMI -B

## 2017-04-28 PROCEDURE — 86900 BLOOD TYPING SEROLOGIC ABO: CPT | Performed by: OBSTETRICS & GYNECOLOGY

## 2017-04-28 PROCEDURE — 77030018836 HC SOL IRR NACL ICUM -A

## 2017-04-28 PROCEDURE — 65410000002 HC RM PRIVATE OB

## 2017-04-28 PROCEDURE — 77030011220 HC DEV ELECSURG COVD -B

## 2017-04-28 PROCEDURE — 77030011943

## 2017-04-28 PROCEDURE — 77030012935 HC DRSG AQUACEL BMS -B

## 2017-04-28 PROCEDURE — 82962 GLUCOSE BLOOD TEST: CPT

## 2017-04-28 PROCEDURE — 75410000002 HC LABOR FEE PER 1 HR

## 2017-04-28 PROCEDURE — 77030018846 HC SOL IRR STRL H20 ICUM -A

## 2017-04-28 PROCEDURE — 76010000391 HC C SECN FIRST 1 HR: Performed by: OBSTETRICS & GYNECOLOGY

## 2017-04-28 PROCEDURE — 77030010507 HC ADH SKN DERMBND J&J -B

## 2017-04-28 RX ORDER — SODIUM CHLORIDE, SODIUM LACTATE, POTASSIUM CHLORIDE, CALCIUM CHLORIDE 600; 310; 30; 20 MG/100ML; MG/100ML; MG/100ML; MG/100ML
125 INJECTION, SOLUTION INTRAVENOUS CONTINUOUS
Status: DISCONTINUED | OUTPATIENT
Start: 2017-04-28 | End: 2017-05-01 | Stop reason: HOSPADM

## 2017-04-28 RX ORDER — ZOLPIDEM TARTRATE 5 MG/1
5 TABLET ORAL
Status: DISCONTINUED | OUTPATIENT
Start: 2017-04-28 | End: 2017-05-01 | Stop reason: HOSPADM

## 2017-04-28 RX ORDER — SODIUM CHLORIDE 0.9 % (FLUSH) 0.9 %
5-10 SYRINGE (ML) INJECTION EVERY 8 HOURS
Status: DISCONTINUED | OUTPATIENT
Start: 2017-04-28 | End: 2017-05-01 | Stop reason: HOSPADM

## 2017-04-28 RX ORDER — BUPIVACAINE HYDROCHLORIDE 2.5 MG/ML
INJECTION, SOLUTION EPIDURAL; INFILTRATION; INTRACAUDAL AS NEEDED
Status: DISCONTINUED | OUTPATIENT
Start: 2017-04-28 | End: 2017-04-28 | Stop reason: HOSPADM

## 2017-04-28 RX ORDER — SODIUM CHLORIDE 0.9 % (FLUSH) 0.9 %
5-10 SYRINGE (ML) INJECTION AS NEEDED
Status: DISCONTINUED | OUTPATIENT
Start: 2017-04-28 | End: 2017-05-01 | Stop reason: HOSPADM

## 2017-04-28 RX ORDER — OXYTOCIN/RINGER'S LACTATE 20/1000 ML
125-500 PLASTIC BAG, INJECTION (ML) INTRAVENOUS ONCE
Status: ACTIVE | OUTPATIENT
Start: 2017-04-28 | End: 2017-04-29

## 2017-04-28 RX ORDER — SODIUM CHLORIDE, SODIUM LACTATE, POTASSIUM CHLORIDE, CALCIUM CHLORIDE 600; 310; 30; 20 MG/100ML; MG/100ML; MG/100ML; MG/100ML
1000 INJECTION, SOLUTION INTRAVENOUS CONTINUOUS
Status: DISCONTINUED | OUTPATIENT
Start: 2017-04-28 | End: 2017-04-28 | Stop reason: HOSPADM

## 2017-04-28 RX ORDER — KETOROLAC TROMETHAMINE 30 MG/ML
30 INJECTION, SOLUTION INTRAMUSCULAR; INTRAVENOUS
Status: DISPENSED | OUTPATIENT
Start: 2017-04-28 | End: 2017-04-29

## 2017-04-28 RX ORDER — ONDANSETRON 2 MG/ML
4 INJECTION INTRAMUSCULAR; INTRAVENOUS
Status: ACTIVE | OUTPATIENT
Start: 2017-04-28 | End: 2017-04-29

## 2017-04-28 RX ORDER — INSULIN LISPRO 100 [IU]/ML
INJECTION, SOLUTION INTRAVENOUS; SUBCUTANEOUS
Status: DISCONTINUED | OUTPATIENT
Start: 2017-04-28 | End: 2017-04-29 | Stop reason: CLARIF

## 2017-04-28 RX ORDER — NALOXONE HYDROCHLORIDE 0.4 MG/ML
0.4 INJECTION, SOLUTION INTRAMUSCULAR; INTRAVENOUS; SUBCUTANEOUS AS NEEDED
Status: DISCONTINUED | OUTPATIENT
Start: 2017-04-28 | End: 2017-04-28 | Stop reason: HOSPADM

## 2017-04-28 RX ORDER — LIDOCAINE HYDROCHLORIDE AND EPINEPHRINE 15; 5 MG/ML; UG/ML
4.5 INJECTION, SOLUTION EPIDURAL AS NEEDED
Status: DISCONTINUED | OUTPATIENT
Start: 2017-04-28 | End: 2017-04-28 | Stop reason: HOSPADM

## 2017-04-28 RX ORDER — SIMETHICONE 80 MG
80 TABLET,CHEWABLE ORAL AS NEEDED
Status: DISCONTINUED | OUTPATIENT
Start: 2017-04-28 | End: 2017-05-01 | Stop reason: HOSPADM

## 2017-04-28 RX ORDER — LIDOCAINE HYDROCHLORIDE AND EPINEPHRINE 20; 5 MG/ML; UG/ML
INJECTION, SOLUTION EPIDURAL; INFILTRATION; INTRACAUDAL; PERINEURAL
Status: DISPENSED
Start: 2017-04-28 | End: 2017-04-29

## 2017-04-28 RX ORDER — SODIUM CHLORIDE 0.9 % (FLUSH) 0.9 %
5-10 SYRINGE (ML) INJECTION AS NEEDED
Status: DISCONTINUED | OUTPATIENT
Start: 2017-04-28 | End: 2017-04-28 | Stop reason: HOSPADM

## 2017-04-28 RX ORDER — LIDOCAINE HYDROCHLORIDE AND EPINEPHRINE 15; 5 MG/ML; UG/ML
INJECTION, SOLUTION EPIDURAL AS NEEDED
Status: DISCONTINUED | OUTPATIENT
Start: 2017-04-28 | End: 2017-04-28 | Stop reason: HOSPADM

## 2017-04-28 RX ORDER — SODIUM CHLORIDE 0.9 % (FLUSH) 0.9 %
5-10 SYRINGE (ML) INJECTION EVERY 8 HOURS
Status: DISCONTINUED | OUTPATIENT
Start: 2017-04-28 | End: 2017-04-28 | Stop reason: HOSPADM

## 2017-04-28 RX ORDER — ACETAMINOPHEN 10 MG/ML
1000 INJECTION, SOLUTION INTRAVENOUS
Status: ACTIVE | OUTPATIENT
Start: 2017-04-28 | End: 2017-04-29

## 2017-04-28 RX ORDER — NALBUPHINE HYDROCHLORIDE 10 MG/ML
10 INJECTION, SOLUTION INTRAMUSCULAR; INTRAVENOUS; SUBCUTANEOUS
Status: DISCONTINUED | OUTPATIENT
Start: 2017-04-28 | End: 2017-04-28 | Stop reason: HOSPADM

## 2017-04-28 RX ORDER — NALOXONE HYDROCHLORIDE 0.4 MG/ML
0.4 INJECTION, SOLUTION INTRAMUSCULAR; INTRAVENOUS; SUBCUTANEOUS AS NEEDED
Status: DISCONTINUED | OUTPATIENT
Start: 2017-04-28 | End: 2017-05-01 | Stop reason: HOSPADM

## 2017-04-28 RX ORDER — FENTANYL CITRATE 50 UG/ML
100 INJECTION, SOLUTION INTRAMUSCULAR; INTRAVENOUS
Status: DISCONTINUED | OUTPATIENT
Start: 2017-04-28 | End: 2017-04-28 | Stop reason: HOSPADM

## 2017-04-28 RX ORDER — CARBOPROST TROMETHAMINE 250 UG/ML
INJECTION, SOLUTION INTRAMUSCULAR AS NEEDED
Status: DISCONTINUED | OUTPATIENT
Start: 2017-04-28 | End: 2017-04-28 | Stop reason: HOSPADM

## 2017-04-28 RX ORDER — TERBUTALINE SULFATE 1 MG/ML
0.25 INJECTION SUBCUTANEOUS AS NEEDED
Status: DISCONTINUED | OUTPATIENT
Start: 2017-04-28 | End: 2017-04-28 | Stop reason: HOSPADM

## 2017-04-28 RX ORDER — ONDANSETRON 2 MG/ML
INJECTION INTRAMUSCULAR; INTRAVENOUS AS NEEDED
Status: DISCONTINUED | OUTPATIENT
Start: 2017-04-28 | End: 2017-04-28 | Stop reason: HOSPADM

## 2017-04-28 RX ORDER — MORPHINE SULFATE 2 MG/ML
2 INJECTION, SOLUTION INTRAMUSCULAR; INTRAVENOUS
Status: DISCONTINUED | OUTPATIENT
Start: 2017-04-28 | End: 2017-05-01 | Stop reason: HOSPADM

## 2017-04-28 RX ORDER — OXYTOCIN IN 5 % DEXTROSE 30/500 ML
1 PLASTIC BAG, INJECTION (ML) INTRAVENOUS
Status: DISCONTINUED | OUTPATIENT
Start: 2017-04-28 | End: 2017-05-01 | Stop reason: HOSPADM

## 2017-04-28 RX ORDER — MINERAL OIL
OIL (ML) ORAL
Status: DISPENSED
Start: 2017-04-28 | End: 2017-04-29

## 2017-04-28 RX ORDER — FENTANYL/BUPIVACAINE/NS/PF 2-1250MCG
PREFILLED PUMP RESERVOIR EPIDURAL
Status: DISPENSED
Start: 2017-04-28 | End: 2017-04-28

## 2017-04-28 RX ORDER — CEFAZOLIN SODIUM IN 0.9 % NACL 2 G/50 ML
INTRAVENOUS SOLUTION, PIGGYBACK (ML) INTRAVENOUS
Status: DISPENSED
Start: 2017-04-28 | End: 2017-04-29

## 2017-04-28 RX ORDER — DIPHENHYDRAMINE HYDROCHLORIDE 50 MG/ML
25 INJECTION, SOLUTION INTRAMUSCULAR; INTRAVENOUS
Status: DISCONTINUED | OUTPATIENT
Start: 2017-04-28 | End: 2017-05-01 | Stop reason: HOSPADM

## 2017-04-28 RX ORDER — PHENYLEPHRINE HCL IN 0.9% NACL 0.4MG/10ML
SYRINGE (ML) INTRAVENOUS AS NEEDED
Status: DISCONTINUED | OUTPATIENT
Start: 2017-04-28 | End: 2017-04-28 | Stop reason: HOSPADM

## 2017-04-28 RX ORDER — NALOXONE HYDROCHLORIDE 0.4 MG/ML
0.2 INJECTION, SOLUTION INTRAMUSCULAR; INTRAVENOUS; SUBCUTANEOUS
Status: ACTIVE | OUTPATIENT
Start: 2017-04-28 | End: 2017-04-29

## 2017-04-28 RX ORDER — NALBUPHINE HYDROCHLORIDE 10 MG/ML
2.5 INJECTION, SOLUTION INTRAMUSCULAR; INTRAVENOUS; SUBCUTANEOUS
Status: DISCONTINUED | OUTPATIENT
Start: 2017-04-28 | End: 2017-05-01 | Stop reason: HOSPADM

## 2017-04-28 RX ORDER — NALBUPHINE HYDROCHLORIDE 10 MG/ML
2.5 INJECTION, SOLUTION INTRAMUSCULAR; INTRAVENOUS; SUBCUTANEOUS
Status: ACTIVE | OUTPATIENT
Start: 2017-04-28 | End: 2017-04-29

## 2017-04-28 RX ORDER — OXYTOCIN IN 5 % DEXTROSE 30/500 ML
1-25 PLASTIC BAG, INJECTION (ML) INTRAVENOUS
Status: DISCONTINUED | OUTPATIENT
Start: 2017-04-28 | End: 2017-05-01 | Stop reason: HOSPADM

## 2017-04-28 RX ORDER — INSULIN GLARGINE 100 [IU]/ML
30 INJECTION, SOLUTION SUBCUTANEOUS DAILY
Status: DISCONTINUED | OUTPATIENT
Start: 2017-04-29 | End: 2017-04-29 | Stop reason: CLARIF

## 2017-04-28 RX ORDER — METHYLERGONOVINE MALEATE 0.2 MG/ML
INJECTION INTRAVENOUS AS NEEDED
Status: DISCONTINUED | OUTPATIENT
Start: 2017-04-28 | End: 2017-04-28 | Stop reason: HOSPADM

## 2017-04-28 RX ORDER — BUPIVACAINE HYDROCHLORIDE 2.5 MG/ML
INJECTION, SOLUTION EPIDURAL; INFILTRATION; INTRACAUDAL
Status: DISPENSED
Start: 2017-04-28 | End: 2017-04-28

## 2017-04-28 RX ORDER — MORPHINE SULFATE 10 MG/ML
5 INJECTION, SOLUTION INTRAMUSCULAR; INTRAVENOUS
Status: ACTIVE | OUTPATIENT
Start: 2017-04-28 | End: 2017-04-29

## 2017-04-28 RX ORDER — FENTANYL/BUPIVACAINE/NS/PF 2-1250MCG
1-16 PREFILLED PUMP RESERVOIR EPIDURAL CONTINUOUS
Status: DISCONTINUED | OUTPATIENT
Start: 2017-04-28 | End: 2017-05-01 | Stop reason: HOSPADM

## 2017-04-28 RX ORDER — OXYTOCIN/RINGER'S LACTATE 20/1000 ML
PLASTIC BAG, INJECTION (ML) INTRAVENOUS
Status: COMPLETED
Start: 2017-04-28 | End: 2017-04-28

## 2017-04-28 RX ORDER — MORPHINE SULFATE 2 MG/ML
2 INJECTION, SOLUTION INTRAMUSCULAR; INTRAVENOUS
Status: ACTIVE | OUTPATIENT
Start: 2017-04-28 | End: 2017-04-29

## 2017-04-28 RX ORDER — FENTANYL CITRATE 50 UG/ML
INJECTION, SOLUTION INTRAMUSCULAR; INTRAVENOUS
Status: DISPENSED
Start: 2017-04-28 | End: 2017-04-28

## 2017-04-28 RX ORDER — OXYCODONE AND ACETAMINOPHEN 10; 325 MG/1; MG/1
1 TABLET ORAL
Status: DISCONTINUED | OUTPATIENT
Start: 2017-04-28 | End: 2017-05-01 | Stop reason: HOSPADM

## 2017-04-28 RX ORDER — FENTANYL CITRATE 50 UG/ML
100 INJECTION, SOLUTION INTRAMUSCULAR; INTRAVENOUS ONCE
Status: DISCONTINUED | OUTPATIENT
Start: 2017-04-28 | End: 2017-04-28 | Stop reason: HOSPADM

## 2017-04-28 RX ORDER — FENTANYL CITRATE 50 UG/ML
INJECTION, SOLUTION INTRAMUSCULAR; INTRAVENOUS AS NEEDED
Status: DISCONTINUED | OUTPATIENT
Start: 2017-04-28 | End: 2017-04-28 | Stop reason: HOSPADM

## 2017-04-28 RX ORDER — MORPHINE SULFATE 0.5 MG/ML
INJECTION, SOLUTION EPIDURAL; INTRATHECAL; INTRAVENOUS AS NEEDED
Status: DISCONTINUED | OUTPATIENT
Start: 2017-04-28 | End: 2017-04-28 | Stop reason: HOSPADM

## 2017-04-28 RX ORDER — CEFAZOLIN SODIUM IN 0.9 % NACL 2 G/50 ML
2 INTRAVENOUS SOLUTION, PIGGYBACK (ML) INTRAVENOUS ONCE
Status: COMPLETED | OUTPATIENT
Start: 2017-04-28 | End: 2017-04-28

## 2017-04-28 RX ADMIN — SODIUM CHLORIDE, SODIUM LACTATE, POTASSIUM CHLORIDE, AND CALCIUM CHLORIDE 125 ML/HR: 600; 310; 30; 20 INJECTION, SOLUTION INTRAVENOUS at 09:55

## 2017-04-28 RX ADMIN — LIDOCAINE HYDROCHLORIDE AND EPINEPHRINE 5 ML: 15; 5 INJECTION, SOLUTION EPIDURAL at 12:29

## 2017-04-28 RX ADMIN — Medication 80 MCG: at 18:34

## 2017-04-28 RX ADMIN — CEFAZOLIN 2 G: 1 INJECTION, POWDER, FOR SOLUTION INTRAMUSCULAR; INTRAVENOUS; PARENTERAL at 17:52

## 2017-04-28 RX ADMIN — BUPIVACAINE HYDROCHLORIDE 1 ML: 2.5 INJECTION, SOLUTION EPIDURAL; INFILTRATION; INTRACAUDAL at 12:40

## 2017-04-28 RX ADMIN — Medication 80 MCG: at 18:38

## 2017-04-28 RX ADMIN — FENTANYL 0.2 MG/100ML-BUPIV 0.125%-NACL 0.9% EPIDURAL INJ 10 ML/HR: 2/0.125 SOLUTION at 12:49

## 2017-04-28 RX ADMIN — MORPHINE SULFATE 5 MG: 0.5 INJECTION, SOLUTION EPIDURAL; INTRATHECAL; INTRAVENOUS at 18:37

## 2017-04-28 RX ADMIN — BUPIVACAINE HYDROCHLORIDE 21 ML: 2.5 INJECTION, SOLUTION EPIDURAL; INFILTRATION; INTRACAUDAL at 12:39

## 2017-04-28 RX ADMIN — NALBUPHINE HYDROCHLORIDE 10 MG: 10 INJECTION, SOLUTION INTRAMUSCULAR; INTRAVENOUS; SUBCUTANEOUS at 11:12

## 2017-04-28 RX ADMIN — KETOROLAC TROMETHAMINE 30 MG: 30 INJECTION, SOLUTION INTRAMUSCULAR at 20:45

## 2017-04-28 RX ADMIN — BUPIVACAINE HYDROCHLORIDE 2 ML: 2.5 INJECTION, SOLUTION EPIDURAL; INFILTRATION; INTRACAUDAL at 12:37

## 2017-04-28 RX ADMIN — LIDOCAINE HYDROCHLORIDE AND EPINEPHRINE 6 ML: 15; 5 INJECTION, SOLUTION EPIDURAL at 17:36

## 2017-04-28 RX ADMIN — LIDOCAINE HYDROCHLORIDE AND EPINEPHRINE 5 ML: 15; 5 INJECTION, SOLUTION EPIDURAL at 18:15

## 2017-04-28 RX ADMIN — Medication 2 MILLI-UNITS/MIN: at 09:41

## 2017-04-28 RX ADMIN — NALBUPHINE HYDROCHLORIDE 10 MG: 10 INJECTION, SOLUTION INTRAMUSCULAR; INTRAVENOUS; SUBCUTANEOUS at 02:54

## 2017-04-28 RX ADMIN — LIDOCAINE HYDROCHLORIDE AND EPINEPHRINE 5 ML: 15; 5 INJECTION, SOLUTION EPIDURAL at 17:35

## 2017-04-28 RX ADMIN — SODIUM CHLORIDE, SODIUM LACTATE, POTASSIUM CHLORIDE, AND CALCIUM CHLORIDE 125 ML/HR: 600; 310; 30; 20 INJECTION, SOLUTION INTRAVENOUS at 17:14

## 2017-04-28 RX ADMIN — NALBUPHINE HYDROCHLORIDE 10 MG: 10 INJECTION, SOLUTION INTRAMUSCULAR; INTRAVENOUS; SUBCUTANEOUS at 05:38

## 2017-04-28 RX ADMIN — ONDANSETRON 4 MG: 2 INJECTION INTRAMUSCULAR; INTRAVENOUS at 18:25

## 2017-04-28 RX ADMIN — Medication 10 ML: at 08:36

## 2017-04-28 RX ADMIN — Medication: at 18:14

## 2017-04-28 RX ADMIN — SODIUM CHLORIDE, SODIUM LACTATE, POTASSIUM CHLORIDE, AND CALCIUM CHLORIDE 125 ML/HR: 600; 310; 30; 20 INJECTION, SOLUTION INTRAVENOUS at 02:00

## 2017-04-28 RX ADMIN — SODIUM CHLORIDE, SODIUM LACTATE, POTASSIUM CHLORIDE, AND CALCIUM CHLORIDE 125 ML/HR: 600; 310; 30; 20 INJECTION, SOLUTION INTRAVENOUS at 22:18

## 2017-04-28 RX ADMIN — Medication 80 MCG: at 18:25

## 2017-04-28 RX ADMIN — SODIUM CHLORIDE, SODIUM LACTATE, POTASSIUM CHLORIDE, AND CALCIUM CHLORIDE 125 ML/HR: 600; 310; 30; 20 INJECTION, SOLUTION INTRAVENOUS at 12:57

## 2017-04-28 RX ADMIN — FENTANYL CITRATE 100 MCG: 50 INJECTION, SOLUTION INTRAMUSCULAR; INTRAVENOUS at 12:34

## 2017-04-28 NOTE — H&P
History & Physical    Name: Salvador Tovar MRN: [id number]  SSN: [id number]    YOB: 1985  Age: 36 y.o. Sex: female        Subjective:     Estimated Date of Delivery: 17  OB History    Para Term  AB SAB TAB Ectopic Multiple Living   1               # Outcome Date GA Lbr Tylor/2nd Weight Sex Delivery Anes PTL Lv   1 Current                   Ms. Mendel Baumgarten is admitted with pregnancy at 38w0d for active labor. Prenatal course was normal. Please see prenatal records for details. Past Medical History:   Diagnosis Date    Infertility, female     Clomid, then \"injections\"    Polycystic disease, ovaries     Type I (juvenile type) diabetes mellitus without mention of complication, uncontrolled 2009    Type 1; went into DKA on 2 occasions     Past Surgical History:   Procedure Laterality Date    HX HEENT      jaw surgery at age 27   [place] [facility] OTHER SURGICAL      heart surgery at age 3 to patch holes in her heart     Social History     Occupational History    Not on file. Social History Main Topics    Smoking status: Never Smoker    Smokeless tobacco: Not on file    Alcohol use No    Drug use: No    Sexual activity: Yes     Partners: Male     Birth control/ protection: None     Family History   Problem Relation Age of Onset    Diabetes Paternal Grandmother      type 1    Diabetes Paternal Grandfather      type 2       Allergies   Allergen Reactions    Ciprofloxacin Hives     Prior to Admission medications    Medication Sig Start Date End Date Taking? Authorizing Provider   insulin lispro (HUMALOG) 100 unit/mL kwikpen 1 units per 3 grams of carbs for breakfast and 1 unit for 4 grams of carbs at lunch and dinner + 1 units for every 40 mg/dl above 130 mg/dl--Dose change 3/23/17--updated med list--did not send prescription to the pharmacy 3/23/17  Yes Melody Matute MD   insulin glargine (TOUJEO SOLOSTAR) 300 unit/mL (1.5 mL) inpn 54 Units by SubCUTAneous route daily.  And Increase as directed up to a max of 70 units per day--Dose change 3/7/17-updated med list--did not send prescription to the pharmacy 3/7/17  Yes Nick Ng MD   CALCIUM PO Take  by mouth daily. Yes Historical Provider   PNV95/FERROUS FUMARATE/FA (PRENATAL PO) Take  by mouth. Yes Historical Provider   Insulin Needles, Disposable, (BD INSULIN PEN NEEDLE UF MINI) 31 gauge x 3/16\" ndle Use as directed 4 times daily 9/6/16   Nick Ng MD   [facility] ULTRA TEST strip Test 4 times daily  Dx: E10.65 9/6/16   Nick Ng MD   Veena Aditya monitoring kit Use as directed: E10.65 11/2/15   Nick Ng MD        Review of Systems: A comprehensive review of systems was negative except for that written in the HPI. Objective:     Vitals:  Vitals:    04/28/17 0134 04/28/17 0140 04/28/17 0355   BP: 139/90  137/83   Pulse: 91  89   Resp: 16  18   Temp: 97.8 °F (36.6 °C)  98.5 °F (36.9 °C)   Weight:  74.8 kg (165 lb)    Height:  5' 2\" (1.575 m)         Physical Exam:  Patient without distress. Heart: Regular rate and rhythm or S1S2 present  Lung: clear to auscultation throughout lung fields, no wheezes, no rales, no rhonchi and normal respiratory effort  Abdomen: soft, nontender  Membranes:  Premature Rupture of Membranes; Amniotic Fluid: clear fluid  Fetal Heart Rate: Reactive    Prenatal Labs:   Lab Results   Component Value Date/Time    Rubella, External Immune (3.98) 09/22/2016    HBsAg, External Neg 09/22/2016    HIV, External Neg 09/22/2016    RPR, External NR 09/22/2016    Gonorrhea, External Neg 10/20/2016    Chlamydia, External Neg 10/20/2016    ABO,Rh O Positive  08/31/2016         Assessment/Plan:     Active Problems:    Premature rupture of membranes in pregnancy (4/28/2017)         Plan: Admit for Reassuring fetal status, Continue plan for vaginal delivery. Group B Strep was negative.     Signed By:  Jessica Harris MD     April 28, 2017

## 2017-04-28 NOTE — PROGRESS NOTES
0745 received bedside report from HEMANTH EdwardsRN, pt doing well at this time     0804 BS is 127    1200 BS 98    1225 Dr. Lamont Hoyos in for epidural

## 2017-04-28 NOTE — PROGRESS NOTES
Care assumed of patient at 4:30 pm.  Sign out by Dr AGARWAL [facility] of poor maternal expulsive efforts.

## 2017-04-28 NOTE — PROGRESS NOTES
1550 Bedside shift change report given to AMANDA Daniels RN (oncoming nurse) by Elijah Cardoza. Nehal Melton RN (offgoing nurse). Report included the following information SBAR, Kardex, Intake/Output, MAR, Accordion, Recent Results and Med Rec Status. 80 Dr Anil Kaplan in room. SVE 10100/0. Begins pushing with patient   [address] Ashwin Molina in room to evaluate patient and pushing effort. Pushes with patient for contractions. 0 Dr Ashwin Molina in room. Patient ineffectively pushing, maternal exhaustion. FHR in 180s. Discussed options with patient. Decision to proceed with . [address] [facility] Dr Shakila Acosta in room to dose for    1856 Patient back in room for postpartum care.  TRANSFER - OUT REPORT:    Verbal report given to Kari Lewis RN(name) on [facility]  being transferred to [facility](unit) for routine progression of care       Report consisted of patients Situation, Background, Assessment and   Recommendations(SBAR). Information from the following report(s) SBAR, Kardex, OR Summary, Procedure Summary, Intake/Output, MAR, Accordion and Recent Results was reviewed with the receiving nurse. Lines:   Peripheral IV 17 Left Wrist (Active)   Site Assessment Clean, dry, & intact 2017  2:03 AM   Phlebitis Assessment 0 2017  2:03 AM   Infiltration Assessment 0 2017  2:03 AM   Dressing Status Clean, dry, & intact 2017  2:03 AM   Dressing Type Tape;Transparent 2017  2:03 AM   Hub Color/Line Status Pink 2017  2:03 AM        Opportunity for questions and clarification was provided.       Patient transported with:   Registered Nurse

## 2017-04-28 NOTE — ANESTHESIA PREPROCEDURE EVALUATION
Anesthetic History   No history of anesthetic complications            Review of Systems / Medical History  Patient summary reviewed, nursing notes reviewed and pertinent labs reviewed    Pulmonary  Within defined limits                 Neuro/Psych   Within defined limits           Cardiovascular  Within defined limits                Exercise tolerance: >4 METS     GI/Hepatic/Renal  Within defined limits              Endo/Other  Within defined limits           Other Findings              Physical Exam    Airway  Mallampati: II  TM Distance: 4 - 6 cm  Neck ROM: normal range of motion   Mouth opening: Normal     Cardiovascular  Regular rate and rhythm,  S1 and S2 normal,  no murmur, click, rub, or gallop             Dental  No notable dental hx       Pulmonary  Breath sounds clear to auscultation               Abdominal  GI exam deferred       Other Findings            Anesthetic Plan    ASA: 2  Anesthesia type: epidural          Induction: Intravenous  Anesthetic plan and risks discussed with: Patient

## 2017-04-28 NOTE — IP AVS SNAPSHOT
Current Discharge Medication List  
  
START taking these medications Dose & Instructions Dispensing Information Comments Morning Noon Evening Bedtime  
 hydroCHLOROthiazide 25 mg tablet Commonly known as:  HYDRODIURIL Your last dose was: Your next dose is:    
   
   
 Dose:  25 mg Take 1 Tab by mouth two (2) times daily as needed. Quantity:  60 Tab Refills:  0  
     
   
   
   
  
 ibuprofen 800 mg tablet Commonly known as:  MOTRIN Your last dose was: Your next dose is:    
   
   
 Dose:  800 mg Take 1 Tab by mouth every eight (8) hours as needed for Pain. Quantity:  40 Tab Refills:  5  
     
   
   
   
  
 oxyCODONE-acetaminophen 5-325 mg per tablet Commonly known as:  PERCOCET Your last dose was: Your next dose is:    
   
   
 Dose:  1 Tab Take 1 Tab by mouth every four (4) hours as needed for Pain. Max Daily Amount: 6 Tabs. Quantity:  30 Tab Refills:  0 ASK your doctor about these medications Dose & Instructions Dispensing Information Comments Morning Noon Evening Bedtime CALCIUM PO Your last dose was: Your next dose is: Take  by mouth daily. Refills:  0  
     
   
   
   
  
 insulin lispro 100 unit/mL kwikpen Commonly known as:  HUMALOG Your last dose was: Your next dose is:    
   
   
 1 units per 3 grams of carbs for breakfast and 1 unit for 4 grams of carbs at lunch and dinner + 1 units for every 40 mg/dl above 130 mg/dl--Dose change 3/23/17--updated med list--did not send prescription to the pharmacy Refills:  0 Insulin Needles (Disposable) 31 gauge x 3/16\" Ndle Commonly known as:  BD INSULIN PEN NEEDLE UF MINI Your last dose was: Your next dose is:    
   
   
 Use as directed 4 times daily Quantity:  400 Pen Needle Refills:  3  
     
   
   
   
  
 ONETOUCH ULTRA TEST strip Generic drug:  glucose blood VI test strips Your last dose was: Your next dose is:    
   
   
 Test 4 times daily  Dx: J40.19 Quantity:  400 Strip Refills:  3  
     
   
   
   
  
 ONETOUCH ULTRAMINI monitoring kit Generic drug:  Blood-Glucose Meter Your last dose was: Your next dose is:    
   
   
 Use as directed: E10.65 Quantity:  1 Kit Refills:  0 PRENATAL PO Your last dose was: Your next dose is: Take  by mouth. Refills:  0  
     
   
   
   
  
 TOUJEO SOLOSTAR 300 unit/mL (1.5 mL) Inpn Generic drug:  insulin glargine Your last dose was: Your next dose is:    
   
   
 Dose:  54 Units 54 Units by SubCUTAneous route daily. And Increase as directed up to a max of 70 units per day--Dose change 3/7/17-updated med list--did not send prescription to the pharmacy Quantity:  6 Adjustable Dose Pre-filled Pen Syringe Refills:  11 Where to Get Your Medications Information on where to get these meds will be given to you by the nurse or doctor. ! Ask your nurse or doctor about these medications  
  hydroCHLOROthiazide 25 mg tablet  
 ibuprofen 800 mg tablet  
 oxyCODONE-acetaminophen 5-325 mg per tablet

## 2017-04-28 NOTE — IP AVS SNAPSHOT
[address] [address] 
[phone] Patient: Shayy Santana MRN: [id number] CVF:3[birth year]81[birth year]18 You are allergic to the following Allergen Reactions Ciprofloxacin Hives Recent Documentation Height Weight Breastfeeding? BMI OB Status Smoking Status 1.575 m 74.8 kg Yes 30.18 kg/m2 Recent pregnancy Never Smoker Unresulted Labs Order Current Status SAMPLE TO BLOOD BANK In process Emergency Contacts Name Discharge Info Relation Home Work Mobile Jermaine Pruitt  Spouse [3] [phone] About your hospitalization You were admitted on:  April 28, 2017 You last received care in the:  [address] [facility] You were discharged on:  May 1, 2017 Unit phone number:  [phone] Why you were hospitalized Your primary diagnosis was:  Not on File Your diagnoses also included:  Premature Rupture Of Membranes In Pregnancy, Labor Abnormal  
  
  
 
  
  
Providers Seen During Your Hospitalizations Provider Role Specialty Primary office phone Mary Kate Lo MD Attending Provider Obstetrics & Gynecology [phone] Kathyrn Kocher, MD Attending Provider Obstetrics & Gynecology [phone] Your Primary Care Physician (PCP) Primary Care Physician Office Phone Office Fax [address] [facility], [address] [place] [address] [phone] Follow-up Information Follow up With Details Comments Contact Info Bren Thomas MD   [address] [place] [address] 169 Misty Ramirez MD 
[address] [place] [address] 
[phone] [facility] PLACE Call As needed for breastfeeding questions or concerns. [address], [address] 
[phone] Perla Nathan MD   [address] [facility] 
[phone] Current Discharge Medication List  
  
START taking these medications Dose & Instructions Dispensing Information Comments Morning Noon Evening Bedtime  
 hydroCHLOROthiazide 25 mg tablet Commonly known as:  HYDRODIURIL Your last dose was: Your next dose is:    
   
   
 Dose:  25 mg Take 1 Tab by mouth two (2) times daily as needed. Quantity:  60 Tab Refills:  0  
     
   
   
   
  
 ibuprofen 800 mg tablet Commonly known as:  MOTRIN Your last dose was: Your next dose is:    
   
   
 Dose:  800 mg Take 1 Tab by mouth every eight (8) hours as needed for Pain. Quantity:  40 Tab Refills:  5  
     
   
   
   
  
 oxyCODONE-acetaminophen 5-325 mg per tablet Commonly known as:  PERCOCET Your last dose was: Your next dose is:    
   
   
 Dose:  1 Tab Take 1 Tab by mouth every four (4) hours as needed for Pain. Max Daily Amount: 6 Tabs. Quantity:  30 Tab Refills:  0 ASK your doctor about these medications Dose & Instructions Dispensing Information Comments Morning Noon Evening Bedtime CALCIUM PO Your last dose was: Your next dose is: Take  by mouth daily. Refills:  0  
     
   
   
   
  
 insulin lispro 100 unit/mL kwikpen Commonly known as:  HUMALOG Your last dose was: Your next dose is:    
   
   
 1 units per 3 grams of carbs for breakfast and 1 unit for 4 grams of carbs at lunch and dinner + 1 units for every 40 mg/dl above 130 mg/dl--Dose change 3/23/17--updated med list--did not send prescription to the pharmacy Refills:  0 Insulin Needles (Disposable) 31 gauge x 3/16\" Ndle Commonly known as:  BD INSULIN PEN NEEDLE UF MINI Your last dose was: Your next dose is:    
   
   
 Use as directed 4 times daily Quantity:  400 Pen Needle Refills:  3  
     
   
   
   
  
 ONETOUCH ULTRA TEST strip Generic drug:  glucose blood VI test strips Your last dose was: Your next dose is:    
   
   
 Test 4 times daily  Dx: J69.18 Quantity:  400 Strip Refills:  3  
     
   
   
   
  
 ONETOUCH ULTRAMINI monitoring kit Generic drug:  Blood-Glucose Meter Your last dose was: Your next dose is:    
   
   
 Use as directed: E10.65 Quantity:  1 Kit Refills:  0 PRENATAL PO Your last dose was: Your next dose is: Take  by mouth. Refills:  0  
     
   
   
   
  
 TOUJEO SOLOSTAR 300 unit/mL (1.5 mL) Inpn Generic drug:  insulin glargine Your last dose was: Your next dose is:    
   
   
 Dose:  54 Units 54 Units by SubCUTAneous route daily. And Increase as directed up to a max of 70 units per day--Dose change 3/7/17-updated med list--did not send prescription to the pharmacy Quantity:  6 Adjustable Dose Pre-filled Pen Syringe Refills:  11 Where to Get Your Medications Information on where to get these meds will be given to you by the nurse or doctor. ! Ask your nurse or doctor about these medications  
  hydroCHLOROthiazide 25 mg tablet  
 ibuprofen 800 mg tablet  
 oxyCODONE-acetaminophen 5-325 mg per tablet Discharge Instructions POSTPARTUM DISCHARGE INSTRUCTIONS Name:  Shannan Morin YOB: 1985 Admission Diagnosis:  Pregnancy Premature rupture of membranes in pregnancy Labor abnormal    
Discharge Diagnosis:   
Problem List as of 5/1/2017  Date Reviewed: 4/20/2017 Codes Class Noted - Resolved Premature rupture of membranes in pregnancy ICD-10-CM: O42.90 ICD-9-CM: 658.10  4/28/2017 - Present Labor abnormal ICD-10-CM: O62.9 ICD-9-CM: 661.90  4/28/2017 - Present Hyperlipidemia LDL goal <100 ICD-10-CM: E78.5 ICD-9-CM: 272.4  11/2/2015 - Present Uncontrolled type 1 diabetes mellitus ([facility] [address] ICD-10-CM: E10.65 ICD-9-CM: 250.03  Unknown - Present Overview Signed 2015  9:12 AM by Bing Brito MD  
  went into DKA on 2 occasions Attending Physician:  Jose Stuart MD 
 
Delivery Type:   Section: What to [facility] at [facility] Your Recovery: A  section, or , is surgery to deliver your baby through a cut, called an incision that the doctor makes in your lower belly and uterus. You may have some pain in your lower belly and need pain medicine for 1 to 2 weeks. You can expect some vaginal bleeding for several weeks. You will probably need about 6 weeks to fully recover. It is important to take it easy while the incision is healing. Avoid heavy lifting, strenuous activities, or exercises that strain the belly muscles while you are recovering. Ask a family member or friend for help with housework, cooking, and shopping. This care sheet gives you a general idea about how long it will take for you to recover. But each person recovers at a different pace. Follow the steps below to get better as quickly as possible. How can you care for yourself at home? Activity · Rest when you feel tired. Getting enough sleep will help you recover. · Try to walk each day. Start by walking a little more than you did the day before. Bit by bit, increase the amount you walk. Walking boosts blood flow and helps prevent pneumonia, constipation, and blood clots. · Avoid strenuous activities, such as bicycle riding, jogging, weightlifting, and aerobic exercise, for 6 weeks or until your doctor says it is okay. · Until your doctor says it is okay, do not lift anything heavier than your baby. · Do not do sit-ups or other exercise that strain the belly muscles for 6 weeks or until your doctor says it is okay. · Hold a pillow over your incision when you cough or take deep breaths. This will support your belly and decrease your pain. · You may shower as usual. Pat the incision dry when you are done. · You will have some vaginal bleeding. Wear sanitary pads. Do not douche or use tampons until your doctor says it is okay. · Ask your doctor when you can drive again. · You will probably need to take at least 6 weeks off work. It depends on the type of work you do and how you feel. · Wait until you are healed (about 4 to 6 weeks) before you have sexual intercourse. Your doctor will tell you when it is okay to have sex. · Talk to your doctor about birth control. You can get pregnant even before your period returns. Also, you can get pregnant while you are breast-feeding. Incision care Your skin is your body's first line of defense against germs, but an incision site leaves an easy way for germs to enter your body. To prevent infection: · Clean your hands frequently and before and after changing any touching any dressings. · If you have strips of tape on the incision, leave the tape on for a week or until it falls off. · Look at your incision closely every day for any changes. Contact your doctor if you experience any signs of infection, such as fever or increased redness at the surgical site. · Wash the area daily with warm, soapy water, and pat it dry. Don't use hydrogen peroxide or alcohol, which can slow healing. You may cover the area with a gauze bandage if it weeps or rubs against clothing. Change the bandage every day. · Keep the area clean and dry. Diet · You can eat your normal diet. If your stomach is upset, try bland, low-fat foods like plain rice, broiled chicken, toast, and yogurt. · Drink plenty of fluids (unless your doctor tells you not to). · You may notice that your bowel movements are not regular right after your surgery. This is common. Try to avoid constipation and straining with bowel movements. You may want to take a fiber supplement every day. If you have not had a bowel movement after a couple of days, ask your doctor about taking a mild laxative. · If you are breast-feeding, do not drink any alcohol. Medicines · Take pain medicines exactly as directed. · If the doctor gave you a prescription medicine for pain, take it as prescribed. · If you are not taking a prescription pain medicine, ask your doctor if you can take an over-the-counter medicine such as acetaminophen (Tylenol), ibuprofen (Advil, Motrin), or naproxen (Aleve), for cramps. Read and follow all instructions on the label. Do not take aspirin, because it can cause more bleeding. Do not take acetaminophen (Tylenol) and other acetaminophen containing medications (i.e. Percocet) at the same time. · If you think your pain medicine is making you sick to your stomach: 
· Take your medicine after meals (unless your doctor has told you not to). · Ask your doctor for a different pain medicine. · If your doctor prescribed antibiotics, take them as directed. Do not stop taking them just because you feel better. You need to take the full course of antibiotics. Mental Health · Many women get the \"baby blues\" during the first few days after childbirth. You may lose sleep, feel irritable, and cry easily. You may feel happy one minute and sad the next. Hormone changes are one cause of these emotional changes. Also, the demands of a new baby, along with visits from relatives or other family needs, add to a mother's stress. The \"baby blues\" often peak around the fourth day. Then they ease up in less than 2 weeks. · If your moodiness or anxiety lasts for more than 2 weeks, or if you feel like life is not worth living, you may have postpartum depression. This is different for each mother. Some mothers with serious depression may worry intensely about their infant's well-being. Others may feel distant from their child. Some mothers might even feel that they might harm their baby. A mother may have signs of paranoia, wondering if someone is watching her. · With all the changes in your life, you may not know if you are depressed. Pregnancy sometimes causes changes in how you feel that are similar to the symptoms of depression. · Symptoms of depression include: · Feeling sad or hopeless and losing interest in daily activities. These are the most common symptoms of depression. · Sleeping too much or not enough. · Feeling tired. You may feel as if you have no energy. · Eating too much or too little. · [employer] (PSI) offers a Warm line; Chat with the Expert phone sessions; Information and Articles about Pregnancy and Postpartum Mood Disorders; Comprehensive List of Free Support Groups; Knowledgeable local coordinators who will offer support, information, and resources; Guide to Resources on Encoding.com; Calendar of events in the  mood disorders community; Latest News and Research; and [facility] [address] for [employer]. Remember - You are not alone; You are not to blame; With help, you will be well. 7-870-284-PPD(0673). WWW. POSTPARTUM. NET · Writing or talking about death, such as writing suicide notes or talking about guns, knives, or pills. Keep the numbers for these national suicide hotlines: 7-323-175-TALK (3-723.792.6429) and 0-997-KDZPBVX (7-572.583.2973). If you or someone you know talks about suicide or feeling hopeless, get help right away. Other instructions · If you breast-feed your baby, you may be more comfortable while you are healing if you place the baby so that he or she is not resting on your belly. Try tucking your baby under your arm, with his or her body along the side you will be feeding on. Support your baby's upper body with your arm. With that hand you can control your baby's head to bring his or her mouth to your breast. This is sometimes called the football hold. Follow-up care is a key part of your treatment and safety.   Be sure to make and go to all appointments, and call your doctor if you are having problems. It's also a good idea to know your test results and keep a list of the medicines you take. When should you call for help? Call 911 anytime you think you may need emergency care. For example, call if: 
· You are thinking of hurting yourself, your baby, or anyone else. · You passed out (lost consciousness). · You have symptoms of a blood clot in your lung (called a pulmonary embolism). These may include: 
· Sudden chest pain. · Trouble breathing. · Coughing up blood. Call your doctor now or seek immediate medical care if: 
 
· You have severe vaginal bleeding. · You are soaking through a pad each hour for 2 or more hours. · Your vaginal bleeding seems to be getting heavier or is still bright red 4 days after delivery. · You are dizzy or lightheaded, or you feel like you may faint. · You are vomiting or cannot keep fluids down. · You have a fever. · You have new or more belly pain. · You have loose stitches, or your incision comes open. · You have symptoms of infection, such as: 
· Increased pain, swelling, warmth, or redness. · Red streaks leading from the incision. · Pus draining from the incision. · A fever · You pass tissue (not just blood). · Your vaginal discharge smells bad. · Your belly feels tender or full and hard. · Your breasts are continuously painful or red. · You feel sad, anxious, or hopeless for more than a few days. · You have sudden, severe pain in your belly. · You have symptoms of a blood clot in your leg (called a deep vein thrombosis), such as: 
· Pain in your calf, back of the knee, thigh, or groin. · Redness and swelling in your leg or groin. · You have symptoms of preeclampsia, such as: 
· Sudden swelling of your face, hands, or feet. · New vision problems (such as dimness or blurring). · A severe headache. · Your blood pressure is higher than it should be or rises suddenly. · You have new nausea or vomiting. Watch closely for changes in your health, and be sure to contact your doctor if you have any problems. Additional Information:  Gestational Diabetes After Childbirth Most of the time, gestational diabetes goes away after a baby is born. But if you have had gestational diabetes, you have a greater chance of having it in a future pregnancy and of developing type 2 diabetes. To check for diabetes, you may have a follow-up glucose tolerance test 6 to 12 weeks after your baby is born or after you stop breast-feeding your baby. You may be able to control your blood sugar with a healthy diet and regular exercise. Staying at a healthy weight also may keep you from getting type 2 diabetes later on. If diet and exercise do not lower your blood sugar enough, you may need to take insulin shots. Insulin is safe during pregnancy. These are general instructions for a healthy lifestyle: No smoking/ No tobacco products/ Avoid exposure to second hand smoke Surgeon General's Warning:  Quitting smoking now greatly reduces serious risk to your health. Obesity, smoking, and sedentary lifestyle greatly increases your risk for illness A healthy diet, regular physical exercise & weight monitoring are important for maintaining a healthy lifestyle Recognize signs and symptoms of STROKE: 
 
F-face looks uneven A-arms unable to move or move unevenly S-speech slurred or non-existent T-time-call 911 as soon as signs and symptoms begin - DO NOT go  
    back to bed or wait to see if you get better - TIME IS BRAIN. I have had the opportunity to make my options or choices for discharge. I have received and understand these instructions. Discharge Orders None Introducing [facility] HE[facility]! Dear Salvador Mcmanus: Thank you for requesting a [employer] account.   Our records indicate that you already have an active [employer] account. You can access your account anytime at [email or web address] Did you know that you can access your hospital and ER discharge instructions at any time in [employer]? You can also review all of your test results from your hospital stay or ER visit. Additional Information If you have questions, please visit the Frequently Asked Questions section of the [employer] website at [email or web address] Remember, [employer] is NOT to be used for urgent needs. For medical emergencies, dial 911. Now available from your iPhone and Android! General Information Please provide this summary of care documentation to your next provider. Patient Signature:  ____________________________________________________________ Date:  ____________________________________________________________  
  
Luis Search Provider Signature:  ____________________________________________________________ Date:  ____________________________________________________________

## 2017-04-28 NOTE — PROGRESS NOTES
Labor Progress Note  Patient seen, fetal heart rate and contraction pattern evaluated, patient examined. Patient Vitals for the past 1 hrs:   Temp   17 1630 99.9 °F (37.7 °C)       Physical Exam:  Cervical Exam:  10 cm dilated    100% effaced    0 station    Presenting Part: cephalic  Cervical Position: anterior  Consistency: Soft  Membranes:  pt came in ruptured  Uterine Activity: Frequency: Every 3 minutes  Fetal Heart Rate: Baseline: 170 per minute  Variability: moderate, minimal  Accelerations: no    Assessment/Plan:  Labor  Progressed to 10 cm, Proceed with  Section Nonreassuring fetal status with labor not progressing normally, findings consistent with failure of descent. Recommended proceeding with  delivery. Risks of bleeding, infection, bladder and bowel damage explained to patient. They understand the situation and consent to the  delivery. 1.  Fetal tachycardia with heart rate baseline 170 and decreasing variability to 170  2. Arrest of decent zero station with no decent of head with over an hour of pushing; pt complaining of maternal exhaustion. 3.  Maternal exhaustion poor expulsive efforts  4. Due to the above c/s discussed with patient vs continued trial of pushing; pt declines further pushing.     5.  Will proceed to OR

## 2017-04-28 NOTE — PROGRESS NOTES
~3383: Bedside report received from JUNITO Duarte, REMY. Care of the patient assumed.  ~0218: Dr Anil Kaplan is paged.  ~0221: Dr Anil Kaplan called back. SBAR report is given and orders are received to admit the patient as inpatient status. ~0710: Dr Anil Kaplan is at the bedside speaking with the patient about the plan of care and reviewing the tracing. SVE done (1/90) and IUPC placed. ~8420: Bedside and Verbal shift change report given to GIGI Richardson RN and DANNY Acosta (nursing student) by GIOVANI Neri RN. Report included the following information SBAR, MAR, Accordion and Recent Results.

## 2017-04-28 NOTE — PROGRESS NOTES
0125 - Pt arrived c/o leaking of fluid that began at 1800 with large gush around 0100 and contractions. Pt denies vaginal bleeding and reports active fetal movement. Large amount of clear fluid pooling on pad. Changed into hospital gown and external monitors applied. 9786 - Care assumed by GIOVANI Lovett.

## 2017-04-29 LAB
BASOPHILS # BLD AUTO: 0 K/UL (ref 0–0.1)
BASOPHILS # BLD: 0 % (ref 0–1)
EOSINOPHIL # BLD: 0 K/UL (ref 0–0.4)
EOSINOPHIL NFR BLD: 0 % (ref 0–7)
ERYTHROCYTE [DISTWIDTH] IN BLOOD BY AUTOMATED COUNT: 13.3 % (ref 11.5–14.5)
GLUCOSE BLD STRIP.AUTO-MCNC: 102 MG/DL (ref 65–100)
GLUCOSE BLD STRIP.AUTO-MCNC: 111 MG/DL (ref 65–100)
GLUCOSE BLD STRIP.AUTO-MCNC: 112 MG/DL (ref 65–100)
GLUCOSE BLD STRIP.AUTO-MCNC: 133 MG/DL (ref 65–100)
GLUCOSE BLD STRIP.AUTO-MCNC: 72 MG/DL (ref 65–100)
GLUCOSE BLD STRIP.AUTO-MCNC: 75 MG/DL (ref 65–100)
GLUCOSE BLD STRIP.AUTO-MCNC: 94 MG/DL (ref 65–100)
HCT VFR BLD AUTO: 30.7 % (ref 35–47)
HGB BLD-MCNC: 10.5 G/DL (ref 11.5–16)
LYMPHOCYTES # BLD AUTO: 7 % (ref 12–49)
LYMPHOCYTES # BLD: 1.1 K/UL (ref 0.8–3.5)
MCH RBC QN AUTO: 30.2 PG (ref 26–34)
MCHC RBC AUTO-ENTMCNC: 34.2 G/DL (ref 30–36.5)
MCV RBC AUTO: 88.2 FL (ref 80–99)
MONOCYTES # BLD: 1.9 K/UL (ref 0–1)
MONOCYTES NFR BLD AUTO: 12 % (ref 5–13)
NEUTS SEG # BLD: 12.6 K/UL (ref 1.8–8)
NEUTS SEG NFR BLD AUTO: 81 % (ref 32–75)
PLATELET # BLD AUTO: 180 K/UL (ref 150–400)
RBC # BLD AUTO: 3.48 M/UL (ref 3.8–5.2)
SERVICE CMNT-IMP: ABNORMAL
SERVICE CMNT-IMP: NORMAL
WBC # BLD AUTO: 15.6 K/UL (ref 3.6–11)

## 2017-04-29 PROCEDURE — 65410000002 HC RM PRIVATE OB

## 2017-04-29 PROCEDURE — 74011636637 HC RX REV CODE- 636/637: Performed by: SPECIALIST

## 2017-04-29 PROCEDURE — 74011250636 HC RX REV CODE- 250/636: Performed by: ANESTHESIOLOGY

## 2017-04-29 PROCEDURE — 74011250637 HC RX REV CODE- 250/637: Performed by: SPECIALIST

## 2017-04-29 PROCEDURE — 82962 GLUCOSE BLOOD TEST: CPT

## 2017-04-29 PROCEDURE — 74011250637 HC RX REV CODE- 250/637: Performed by: OBSTETRICS & GYNECOLOGY

## 2017-04-29 PROCEDURE — 85025 COMPLETE CBC W/AUTO DIFF WBC: CPT | Performed by: OBSTETRICS & GYNECOLOGY

## 2017-04-29 PROCEDURE — 36415 COLL VENOUS BLD VENIPUNCTURE: CPT | Performed by: OBSTETRICS & GYNECOLOGY

## 2017-04-29 RX ORDER — MAGNESIUM SULFATE 100 %
4 CRYSTALS MISCELLANEOUS AS NEEDED
Status: DISCONTINUED | OUTPATIENT
Start: 2017-04-29 | End: 2017-05-01 | Stop reason: HOSPADM

## 2017-04-29 RX ORDER — INSULIN LISPRO 100 [IU]/ML
INJECTION, SOLUTION INTRAVENOUS; SUBCUTANEOUS
Status: DISCONTINUED | OUTPATIENT
Start: 2017-04-29 | End: 2017-05-01 | Stop reason: HOSPADM

## 2017-04-29 RX ORDER — SENNOSIDES 8.6 MG/1
1 TABLET ORAL DAILY
Status: DISCONTINUED | OUTPATIENT
Start: 2017-04-30 | End: 2017-05-01 | Stop reason: HOSPADM

## 2017-04-29 RX ORDER — INSULIN LISPRO 100 [IU]/ML
INJECTION, SOLUTION INTRAVENOUS; SUBCUTANEOUS AS NEEDED
Status: DISCONTINUED | OUTPATIENT
Start: 2017-04-29 | End: 2017-05-01 | Stop reason: HOSPADM

## 2017-04-29 RX ORDER — DEXTROSE 50 % IN WATER (D50W) INTRAVENOUS SYRINGE
12.5-25 AS NEEDED
Status: DISCONTINUED | OUTPATIENT
Start: 2017-04-29 | End: 2017-05-01 | Stop reason: HOSPADM

## 2017-04-29 RX ORDER — IBUPROFEN 400 MG/1
800 TABLET ORAL EVERY 8 HOURS
Status: DISCONTINUED | OUTPATIENT
Start: 2017-04-29 | End: 2017-05-01 | Stop reason: HOSPADM

## 2017-04-29 RX ORDER — HYDROMORPHONE HYDROCHLORIDE 2 MG/1
1 TABLET ORAL
Status: DISCONTINUED | OUTPATIENT
Start: 2017-04-29 | End: 2017-05-01 | Stop reason: HOSPADM

## 2017-04-29 RX ADMIN — IBUPROFEN 800 MG: 400 TABLET, FILM COATED ORAL at 11:18

## 2017-04-29 RX ADMIN — INSULIN LISPRO 5 UNITS: 100 INJECTION, SOLUTION INTRAVENOUS; SUBCUTANEOUS at 12:32

## 2017-04-29 RX ADMIN — OXYCODONE HYDROCHLORIDE AND ACETAMINOPHEN 1 TABLET: 10; 325 TABLET ORAL at 11:18

## 2017-04-29 RX ADMIN — KETOROLAC TROMETHAMINE 30 MG: 30 INJECTION, SOLUTION INTRAMUSCULAR at 04:54

## 2017-04-29 RX ADMIN — OXYCODONE HYDROCHLORIDE AND ACETAMINOPHEN 1 TABLET: 10; 325 TABLET ORAL at 23:58

## 2017-04-29 RX ADMIN — INSULIN LISPRO 9 UNITS: 100 INJECTION, SOLUTION INTRAVENOUS; SUBCUTANEOUS at 18:12

## 2017-04-29 RX ADMIN — OXYCODONE HYDROCHLORIDE AND ACETAMINOPHEN 1 TABLET: 10; 325 TABLET ORAL at 15:33

## 2017-04-29 RX ADMIN — SIMETHICONE CHEW TAB 80 MG 80 MG: 80 TABLET ORAL at 18:34

## 2017-04-29 RX ADMIN — IBUPROFEN 800 MG: 400 TABLET, FILM COATED ORAL at 19:45

## 2017-04-29 RX ADMIN — OXYCODONE HYDROCHLORIDE AND ACETAMINOPHEN 1 TABLET: 10; 325 TABLET ORAL at 19:45

## 2017-04-29 NOTE — OP NOTES
[address] [place] [address], [address] [place] [address] Ave   OP NOTE       Name:  Matilde King   MR#:  [id number]   :  1985   Account #:  [de-identified]    Surgery Date:  2017   Date of Adm:  2017       PREOPERATIVE DIAGNOSES   1. Nonreassuring fetal heart tracing. 2. Maternal exhaustion. 3. Arrest of descent. 4. Term pregnancy. POSTOPERATIVE DIAGNOSIS: Status post primary    section, viable male infant. PROCEDURES PERFORMED:  Primary  section. ESTIMATED BLOOD LOSS: 900. SPECIMENS REMOVED: Pathology: Placenta. ANESTHESIA:  Epidural.     FINDINGS: Gravid, normal-appearing uterus. INDICATIONS FOR PROCEDURE: The patient is a 22-year-old G1,   P0 at 45 and 0 weeks who was admitted to labor and delivery in active   labor with rupture of membranes. The patient had a normally   progressing labor and started pushing roughly around 4 p.m.; however,   the patient's maternal expulsive efforts were poor. Fetal tachycardia   was noted around 4 p.m. with baseline change to 170 beats per minute   and increasing to 180 beats per minute prior to decision for C-  section. The patient's pushing efforts were compounded by her   exhaustion. The patient's temperature reached 99.5, and discussion   was made with the patient about continuing pushing versus    delivery. Secondary to fetal tachycardia with decreasing variability,   maternal exhaustion, and no descent of the fetal head at 0 station   despite over an hour of pushing, the decision was made to take the   patient to the operating room. PROCEDURE: The patient was taken back to the operating room in   stable condition. She was placed on the operating room table in a   supine position. The patient already had a working epidural and had   been redosed prior to being brought back to the operating room. The   patient was prepped and draped in normal sterile fashion.  A Jensen was   draining the patient's bladder. A time-out was performed. A Pfannenstiel incision was made 2 cm above the pubic symphysis   with a knife. The knife was used to dissect the subcutaneous layer   down to the level of the fascia. The fascia was then scored with the   knife, and the fascial incision was extended bilaterally the length of the   skin incision. Kochers were selected and the superior leaf of the fascia   was uplifted. The rectus abdominis muscles were bluntly dissected off   of the fascia, and sharp dissection of the rectus abdominis muscles in   the midline was performed with curved Wilks scissors. Kochers were   then removed and placed on the inferior leaf of the fascia. The rectus   abdominis muscles were then divided off bluntly and then sharply in   the midline with the curved Wilks scissors down to the level of the   pubic symphysis. The rectus abdominis muscles were then    in the midline, the peritoneum was then entered bluntly. A bladder   blade was then placed over the lower uterine segment. Smooth   pickups and Metzenbaum scissors were then used to create a bladder   flap. The bladder flap was then digitally dissected downward. A low   transverse uterine incision was then made. A hand was placed in the   hysterotomy incision. The fetal head was low in the pelvis and was   able to be brought out of the uterine incision. There was no nuchal   cord. The shoulders and the body were delivered. The baby appeared   vigorous at the time of delivery. The mouth and nares were suctioned,   and the cord was doubly clamped and the baby was passed off to the   awaiting nursery team. Cord blood was then obtained. The placenta was then delivered using assisted delivery. Secondary to   type 1 diabetes, nonreassuring fetal heart tracing, the placenta was   then sent off to Pathology. The uterus was exteriorized. A moist lap   was wrapped around the body of the uterus.  Another lap was used to   clean out any debris inside the uterus. Of note, the uterus was atonic. Pitocin was initiated and Methergine 0.2 was given IM. A double layer   closure was performed. The first layer was a running interlocking   suture of 0 Vicryl, the second layer was an imbricating suture of 0   Vicryl over the first. This was able to obtain hemostasis; however, the   uterus still continued to be atonic. Therefore, Hemabate 0.25 mg was   then given IM. The uterus was then replaced back into the body after   copious irrigation of the posterior gutter. Once the uterus was replaced   back inside the body, the uterine incision was inspected. There   appeared to be mild bleeding coming from the left apex of the uterine   incision. The uterus was then re-exteriorized, and a figure-of-eight   suture was placed at the left apex of the uterine incision. This was able   to obtain hemostasis. The uterus was then replaced back inside the   body all blood clots and debris was removed from the body. Lay was   then placed over the uterine incision, which appeared to be   hemostatic. The muscles were then inspected, and they appeared also   to be hemostatic. The fascia was then closed using a running suture of   0 Vicryl. The subcutaneous layer was closed with interrupted 3-0   Vicryl, and the subcuticular layer was closed with absorbable staples in   the normal fashion. A dressing was then placed on top of the incision. The patient will be taken to the recovery area and will be followed as is   routine postoperatively.          MD YANN Will / Kora.Seip   D:  04/28/2017   23:05   T:  04/28/2017   23:40   Job #:  [id number]

## 2017-04-29 NOTE — PROGRESS NOTES
Hourly rounds completed from 8034-9202  Hourly rounds completed from 9509-2351    6:30 AM  Patient up to bathroom with assistance. Jensen catheter removed without difficulty. Gabrielle care education given. Check void due by 1230. Aware to call for assistance OOB. Ambulated back to bed without issue. 7:00 AM  AC blood glucose 111. Patient does not want to take prescribed 0730 Humalog insulin. At home, she counts carbs and takes Toujeo and humalog. 8:15 AM  Placed call to MD David Lopez and [facility]. 8:42 AM  Orders from MD David Lopez for endocrinologist consult. 9:03 AM  Breakfast was served at 0830. Patient took 5 units of her own Humalog at 0830 after counting carbs. She stated she needed to take her own because we were taking too long to figure out her medications and she already knows what to take. Patient refusing her 0900 lantus insulin this morning.       Hourly rounds completed from 6023-2041

## 2017-04-29 NOTE — PROGRESS NOTES
Post-Operative Day Number 1 Progress Note    Patient doing well post-op day 1 from  delivery without significant complaints. Pain controlled on current medication. Voiding without difficulty, normal lochia. Vitals:  Patient Vitals for the past 8 hrs:   BP Temp Pulse Resp   17 0346 112/82 98.5 °F (36.9 °C) 90 16     Temp (24hrs), Av.9 °F (37.2 °C), Min:98.3 °F (36.8 °C), Max:99.9 °F (37.7 °C)      Vital signs stable, afebrile. Exam:  Patient without distress. Abdomen soft, fundus firm at level of umbilicus, non tender. Incision dry and clean without erythema. Lower extremities are negative for swelling, cords or tenderness. Lab/Data Review: All lab results for the last 24 hours reviewed. Assessment and Plan:  Patient appears to be having uncomplicated post- course. Continue routine post-op care and maternal education.

## 2017-04-29 NOTE — PROGRESS NOTES
TRANSFER - IN REPORT:    Verbal report received from AMANDA Rodriguez RN (name) on Sheffield Krabbe  being received from L&D (unit) for routine progression of care      Report consisted of patients Situation, Background, Assessment and   Recommendations(SBAR). Information from the following report(s) SBAR was reviewed with the receiving nurse. Opportunity for questions and clarification was provided. Assessment completed upon patients arrival to unit and care assumed.

## 2017-04-29 NOTE — ROUTINE PROCESS
Bedside and Verbal shift change report given to DANNY Graves RN (oncoming nurse) by Yeison Clifford RN (offgoing nurse). Report included the following information SBAR, Kardex, Procedure Summary, Intake/Output, MAR and Recent Results. [address] with Dr. Brian Busby re: no on call endocrinologist. He will call Dr. Mya Fermin and call us back. Pt states just took her own Humalog 5u for 50 carbs for 1:10 ratiio. [address] with Dr. Brian Busby and he left message for Dr. Susanna Bullock. Pt states has plan from Dr. Susanna Bullock and she will get it from home ASA.     Hourly rounds completed 3552-8803  Hourly rounds completed 8649-4120  Hourly rounds completed 9770-3486

## 2017-04-29 NOTE — ANESTHESIA POSTPROCEDURE EVALUATION
Post-Anesthesia Evaluation and Assessment    Patient: Sheffield Krabbe MRN: [id number]  SSN: [id number]    YOB: 1985  Age: 36 y.o. Sex: female       Cardiovascular Function/Vital Signs  Visit Vitals    /70    Pulse 92    Temp 37.2 °C (98.9 °F)    Resp 14    Ht 5' 2\" (1.575 m)    Wt 74.8 kg (165 lb)    SpO2 95%    Breastfeeding Yes    BMI 30.18 kg/m2       Patient is status post epidural anesthesia for Procedure(s):   SECTION. Nausea/Vomiting: None    Postoperative hydration reviewed and adequate. Pain:  Pain Scale 1: Numeric (0 - 10) (17)  Pain Intensity 1: 0 (17)   Managed    Neurological Status:   Neuro (WDL): Within Defined Limits (17)   At baseline    Mental Status and Level of Consciousness: Arousable    Pulmonary Status:   O2 Device: Room air (17)   Adequate oxygenation and airway patent    Complications related to anesthesia: None    Post-anesthesia assessment completed.  No concerns    Signed By: Aylin Salazar MD     2017

## 2017-04-29 NOTE — LACTATION NOTE
This note was copied from a baby's chart. Initial Lactation Consultation - Baby delivered by  yesterday to a  mom at 37 weeks. Mom has a history of PCOS, Type 1 diabetes and infertility that required medication to get pregnant. Baby has been latching well but he has had some low blood sugars and the doctor has ordered for the baby to have formula after nursing. I set the mom up with the breast pump and encouraged her to pump after nursing. She should offer the baby any colostrum she expresses. Mom should offer the breast at least every 3 hours. Dad is comfortable syringe feeding the baby.

## 2017-04-30 LAB
GLUCOSE BLD STRIP.AUTO-MCNC: 132 MG/DL (ref 65–100)
GLUCOSE BLD STRIP.AUTO-MCNC: 155 MG/DL (ref 65–100)
GLUCOSE BLD STRIP.AUTO-MCNC: 202 MG/DL (ref 65–100)
SERVICE CMNT-IMP: ABNORMAL

## 2017-04-30 PROCEDURE — 74011250637 HC RX REV CODE- 250/637: Performed by: SPECIALIST

## 2017-04-30 PROCEDURE — 74011250637 HC RX REV CODE- 250/637: Performed by: OBSTETRICS & GYNECOLOGY

## 2017-04-30 PROCEDURE — 82962 GLUCOSE BLOOD TEST: CPT

## 2017-04-30 PROCEDURE — 74011636637 HC RX REV CODE- 636/637: Performed by: SPECIALIST

## 2017-04-30 PROCEDURE — 65410000002 HC RM PRIVATE OB

## 2017-04-30 RX ADMIN — OXYCODONE HYDROCHLORIDE AND ACETAMINOPHEN 1 TABLET: 10; 325 TABLET ORAL at 16:31

## 2017-04-30 RX ADMIN — IBUPROFEN 800 MG: 400 TABLET, FILM COATED ORAL at 12:25

## 2017-04-30 RX ADMIN — SENNOSIDES 8.6 MG: 8.6 TABLET, FILM COATED ORAL at 09:00

## 2017-04-30 RX ADMIN — OXYCODONE HYDROCHLORIDE AND ACETAMINOPHEN 1 TABLET: 10; 325 TABLET ORAL at 04:06

## 2017-04-30 RX ADMIN — IBUPROFEN 800 MG: 400 TABLET, FILM COATED ORAL at 20:29

## 2017-04-30 RX ADMIN — OXYCODONE HYDROCHLORIDE AND ACETAMINOPHEN 1 TABLET: 10; 325 TABLET ORAL at 12:25

## 2017-04-30 RX ADMIN — INSULIN LISPRO 1 UNITS: 100 INJECTION, SOLUTION INTRAVENOUS; SUBCUTANEOUS at 12:46

## 2017-04-30 RX ADMIN — HYDROMORPHONE HYDROCHLORIDE 1 MG: 2 TABLET ORAL at 20:29

## 2017-04-30 RX ADMIN — IBUPROFEN 800 MG: 400 TABLET, FILM COATED ORAL at 04:06

## 2017-04-30 RX ADMIN — INSULIN LISPRO 4 UNITS: 100 INJECTION, SOLUTION INTRAVENOUS; SUBCUTANEOUS at 12:44

## 2017-04-30 RX ADMIN — INSULIN LISPRO 7 UNITS: 100 INJECTION, SOLUTION INTRAVENOUS; SUBCUTANEOUS at 08:44

## 2017-04-30 RX ADMIN — SIMETHICONE CHEW TAB 80 MG 80 MG: 80 TABLET ORAL at 16:31

## 2017-04-30 RX ADMIN — INSULIN LISPRO 1 UNITS: 100 INJECTION, SOLUTION INTRAVENOUS; SUBCUTANEOUS at 17:56

## 2017-04-30 RX ADMIN — INSULIN LISPRO 4 UNITS: 100 INJECTION, SOLUTION INTRAVENOUS; SUBCUTANEOUS at 17:55

## 2017-04-30 RX ADMIN — HYDROMORPHONE HYDROCHLORIDE 1 MG: 2 TABLET ORAL at 08:18

## 2017-04-30 NOTE — LACTATION NOTE
This note was copied from a baby's chart. Mom has continued to put the baby to the breast. He has feedings that he latches better than other feedings. Mom can get him latched by herself. Mom has compression stripe scab on the right nipple and she says it is painful for baby to latch on that breast. I gave mom a nipple shield so she could try to get the baby latched on that side. At this last feeding I helped mom get the baby latched to the left breast in the biological position. Baby was not vigorous at the breast. He would suck a few times and then stop. I had dad put some formula in baby's mouth while he was on the breast to try to entice him to keep sucking. Mom encouraged to pump after feedings to increase breast stimulation and milk production. She is only getting a small amount of colostrum when she pumps.

## 2017-04-30 NOTE — ROUTINE PROCESS
Bedside and Verbal shift change report given to DANNY Ramos RN (oncoming nurse) by Az Gutierrez RN (offgoing nurse). Report included the following information SBAR, Kardex, Procedure Summary, Intake/Output, MAR and Recent Results.    Hourly rounds completed 9137-3063  Hourly rounds completed 1543-0666

## 2017-04-30 NOTE — PROGRESS NOTES
Post-Operative  Day 2    [address] for the patient's :  Jacques Lazo [[id number]]   , Low Transverse   Patient doing well without significant complaint. Nausea and vomiting resolved, tolerating liquids, passing flatus, voiding and ambulating without difficulty. Vitals:    Visit Vitals    /76 (BP 1 Location: Left arm, BP Patient Position: At rest)    Pulse 83    Temp 98 °F (36.7 °C)    Resp 16    Ht 5' 2\" (1.575 m)    Wt 74.8 kg (165 lb)    SpO2 (!) 88%    Breastfeeding Yes    BMI 30.18 kg/m2     Temp (24hrs), Av.3 °F (36.8 °C), Min:98 °F (36.7 °C), Max:98.5 °F (36.9 °C)        Exam:        Patient without distress. Abdomen, bowel sounds present, soft, expected tenderness, fundus firm                Wound incision clean, dry and intact               Lower extremities are negative for swelling, cords or tenderness.     Labs:   Lab Results   Component Value Date/Time    WBC 15.6 2017 06:19 AM    WBC 8.8 2017 02:45 AM    HGB 10.5 2017 06:19 AM    HGB 13.1 2017 02:45 AM    HCT 30.7 2017 06:19 AM    HCT 37.9 2017 02:45 AM    PLATELET 986  06:19 AM    PLATELET 097  02:45 AM       Recent Results (from the past 24 hour(s))   GLUCOSE, POC    Collection Time: 17 12:09 PM   Result Value Ref Range    Glucose (POC) 133 (H) 65 - 100 mg/dL    Performed by Te Coates    GLUCOSE, POC    Collection Time: 17  5:35 PM   Result Value Ref Range    Glucose (POC) 112 (H) 65 - 100 mg/dL    Performed by Ajay Webb, POC    Collection Time: 17 10:42 PM   Result Value Ref Range    Glucose (POC) 75 65 - 100 mg/dL    Performed by [address] [place] [address], POC    Collection Time: 17 11:22 PM   Result Value Ref Range    Glucose (POC) 72 65 - 100 mg/dL    Performed by [address] [place] [address], POC    Collection Time: 17 11:53 PM   Result Value Ref Range    Glucose (POC) 102 (H) 65 - 100 mg/dL    Performed by [address] [place] [address], [facility]    Collection Time: 04/30/17  7:12 AM   Result Value Ref Range    Glucose (POC) 132 (H) 65 - 100 mg/dL    Performed by Brittany Renee        Assessment: Post-Op day 2, doing well      Plan:   1.  Routine post-operative care

## 2017-04-30 NOTE — PROGRESS NOTES
Bedside and Verbal shift change report given to [address] [place], [place] [address]way 951 (oncoming nurse) by [employer] RN (offgoing nurse).  Report included the following information SBAR, Kardex and MAR.     1600-hourly rounds completed from 3989-4649    2000-hourly rounds completed from 5609-0072

## 2017-04-30 NOTE — PROGRESS NOTES
Bedside shift change report given to Samanta Maki RN (oncoming nurse) by Holly Alamo RN (offgoing nurse). Report included the following information SBAR.     9:45 PM  Patient complaining of pain 6/10 after taking Motrin and Percoset . Placed ice pack on incision and encouraged rest.  Received telephone order from MD Prabhakar for 1 mg PO dilaudid q6 PRN. Will continue to monitor patient. 10:50 PM  Patient states that ice and rest decreased pain level, so she declines additional pain meds at this time.      Hourly rounds completed from 2738-0982  Hourly rounds completed from 7119-0910  Hourly rounds completed from 6882-9662

## 2017-05-01 VITALS
OXYGEN SATURATION: 88 % | SYSTOLIC BLOOD PRESSURE: 129 MMHG | HEIGHT: 62 IN | DIASTOLIC BLOOD PRESSURE: 84 MMHG | HEART RATE: 61 BPM | RESPIRATION RATE: 16 BRPM | WEIGHT: 165 LBS | TEMPERATURE: 98.3 F | BODY MASS INDEX: 30.36 KG/M2

## 2017-05-01 LAB
GLUCOSE BLD STRIP.AUTO-MCNC: 104 MG/DL (ref 65–100)
GLUCOSE BLD STRIP.AUTO-MCNC: 117 MG/DL (ref 65–100)
GLUCOSE BLD STRIP.AUTO-MCNC: 67 MG/DL (ref 65–100)
GLUCOSE BLD STRIP.AUTO-MCNC: 80 MG/DL (ref 65–100)
SERVICE CMNT-IMP: ABNORMAL
SERVICE CMNT-IMP: ABNORMAL
SERVICE CMNT-IMP: NORMAL
SERVICE CMNT-IMP: NORMAL

## 2017-05-01 PROCEDURE — 74011250637 HC RX REV CODE- 250/637: Performed by: OBSTETRICS & GYNECOLOGY

## 2017-05-01 PROCEDURE — 82962 GLUCOSE BLOOD TEST: CPT

## 2017-05-01 PROCEDURE — 74011250637 HC RX REV CODE- 250/637: Performed by: SPECIALIST

## 2017-05-01 PROCEDURE — 74011636637 HC RX REV CODE- 636/637: Performed by: SPECIALIST

## 2017-05-01 RX ORDER — OXYCODONE AND ACETAMINOPHEN 5; 325 MG/1; MG/1
1 TABLET ORAL
Qty: 30 TAB | Refills: 0 | Status: SHIPPED | OUTPATIENT
Start: 2017-05-01 | End: 2017-06-15

## 2017-05-01 RX ORDER — IBUPROFEN 800 MG/1
800 TABLET ORAL
Qty: 40 TAB | Refills: 5 | Status: SHIPPED | OUTPATIENT
Start: 2017-05-01 | End: 2017-06-15

## 2017-05-01 RX ORDER — HYDROCHLOROTHIAZIDE 25 MG/1
25 TABLET ORAL
Qty: 60 TAB | Refills: 0 | Status: SHIPPED | OUTPATIENT
Start: 2017-05-01 | End: 2017-06-15

## 2017-05-01 RX ADMIN — OXYCODONE HYDROCHLORIDE AND ACETAMINOPHEN 1 TABLET: 10; 325 TABLET ORAL at 14:58

## 2017-05-01 RX ADMIN — IBUPROFEN 800 MG: 400 TABLET, FILM COATED ORAL at 13:28

## 2017-05-01 RX ADMIN — IBUPROFEN 800 MG: 400 TABLET, FILM COATED ORAL at 04:56

## 2017-05-01 RX ADMIN — INSULIN LISPRO 7 UNITS: 100 INJECTION, SOLUTION INTRAVENOUS; SUBCUTANEOUS at 08:28

## 2017-05-01 RX ADMIN — OXYCODONE HYDROCHLORIDE AND ACETAMINOPHEN 1 TABLET: 10; 325 TABLET ORAL at 10:54

## 2017-05-01 RX ADMIN — INSULIN LISPRO 6 UNITS: 100 INJECTION, SOLUTION INTRAVENOUS; SUBCUTANEOUS at 13:55

## 2017-05-01 RX ADMIN — SENNOSIDES 8.6 MG: 8.6 TABLET, FILM COATED ORAL at 13:28

## 2017-05-01 RX ADMIN — OXYCODONE HYDROCHLORIDE AND ACETAMINOPHEN 1 TABLET: 10; 325 TABLET ORAL at 02:32

## 2017-05-01 RX ADMIN — OXYCODONE HYDROCHLORIDE AND ACETAMINOPHEN 1 TABLET: 10; 325 TABLET ORAL at 06:47

## 2017-05-01 NOTE — ROUTINE PROCESS
Bedside and Verbal shift change report given to Theresa Bridges RN (oncoming nurse) by Jenna Thompson RN (offgoing nurse). Report included the following information SBAR, Kardex and Intake/Output. 3384-2396: Hourly rounds completed. 9407-7919: Hourly rounds completed. 3940-7165: Hourly rounds completed. HYPOGLYCEMIC EPISODE DOCUMENTATION    Patient with hypoglycemic episode(s) at 0050(time) on 5/1/17(date). BG value(s) pre-treatment 79    Was patient symptomatic?  [x] yes, [] no  (Blurry vision)  Patient was treated with the following rescue medications/treatments: [] D50                [] Glucose tablets                [] Glucagon                [] 4oz juice                [] 6oz reg soda                [x] 8oz low fat milk  BG value post-treatment: 80  Once BG treated and value greater than 80mg/dl, pt was provided with the following:  [x] snack  [] meal

## 2017-05-01 NOTE — ROUTINE PROCESS
0730 Bedside report received from Hiwot Almanza RN using ob sbar format. 1200  Hourly rounds completed by RN from 8528-7452.  1600  Hourly rounds completed by RN from 5144-6064.  1900  Hourly rounds completed by RN from 8473-5037.

## 2017-05-01 NOTE — LACTATION NOTE
This note was copied from a baby's chart. Mom did not put the baby to the breast during the night. She has been pumping for stimulation and just beginning to get a small amount of colostrum. The parents have been offering the baby formula via syringe and he is taking it well. Mom states that her right nipple has a scab on it. She has been using lanolin. I called moms OB and they will call in a prescription for michelle newmans cream that dad can  at the pharmacy after discharge. Mom has no further questions for lactation at this time.

## 2017-05-01 NOTE — DIABETES MGMT
DTC Progress Note    Recommendations/ Comments: Chart reviewed on Berkley Pruitt for hypoglycemia this morning. Patient is a type 1 diabetic who delivered on Friday. Discussed episode with patient who reports that her insulin doses are being adjusted by Dr. Faye Jara now that she has delivered and patient is aware that her insulin needs have now decreased since delivery. DTC will continue to follow. Patient is a 36 y.o. female with a history of  Type 1 Diabetes on insulin injections: Humalog : pre meal with varying carb ratio, Toujeo, adjusted frequently during pregnancy - started at 54 units  intensive insulin injection program at home. A1c:   Lab Results   Component Value Date/Time    Hemoglobin A1c 6.6 12/27/2016 09:28 AM    Hemoglobin A1c 6.8 11/28/2016 12:03 PM       Recent Glucose Results:   Lab Results   Component Value Date/Time    GLUCPOC 117 (H) 05/01/2017 08:20 AM    GLUCPOC 80 05/01/2017 01:08 AM    GLUCPOC 67 05/01/2017 12:50 AM        Lab Results   Component Value Date/Time    Creatinine 0.59 04/28/2017 02:45 AM       Active Orders   Diet    DIET DIABETIC WITH OPTIONS Consistent Carb 2400kcal; Regular        PO intake: No data found. Current hospital DM medication: Toujeo 30 units, Humalog scale    Will continue to follow as needed.     Thank you  Santi Antonio, MS, RN, CDE

## 2017-05-01 NOTE — DISCHARGE INSTRUCTIONS
POSTPARTUM DISCHARGE INSTRUCTIONS       Name:  Jennifer Villareal  YOB: 1985  Admission Diagnosis:  Pregnancy   Premature rupture of membranes in pregnancy  Labor abnormal     Discharge Diagnosis:    Problem List as of 2017  Date Reviewed: 2017          Codes Class Noted - Resolved    Premature rupture of membranes in pregnancy ICD-10-CM: O42.90  ICD-9-CM: 658.10  2017 - Present        Labor abnormal ICD-10-CM: O62.9  ICD-9-CM: 661.90  2017 - Present        Hyperlipidemia LDL goal <100 ICD-10-CM: E78.5  ICD-9-CM: 272.4  2015 - Present        Uncontrolled [facility] ([facility] ICD-10-CM: E10.65  ICD-9-CM: 250.03  Unknown - Present    Overview Signed 2015  9:12 AM by Stefany Stock MD     went into DKA on 2 occasions                 Attending Physician:  Jasmyne Mckenzie MD    Delivery Type:   Section: What to Expect at Home    Your Recovery:  A  section, or , is surgery to deliver your baby through a cut, called an incision that the doctor makes in your lower belly and uterus. You may have some pain in your lower belly and need pain medicine for 1 to 2 weeks. You can expect some vaginal bleeding for several weeks. You will probably need about 6 weeks to fully recover. It is important to take it easy while the incision is healing. Avoid heavy lifting, strenuous activities, or exercises that strain the belly muscles while you are recovering. Ask a family member or friend for help with housework, cooking, and shopping. This care sheet gives you a general idea about how long it will take for you to recover. But each person recovers at a different pace. Follow the steps below to get better as quickly as possible. How can you care for yourself at home? Activity  · Rest when you feel tired. Getting enough sleep will help you recover. · Try to walk each day. Start by walking a little more than you did the day before.  Bit by bit, increase the amount you walk. Walking boosts blood flow and helps prevent pneumonia, constipation, and blood clots. · Avoid strenuous activities, such as bicycle riding, jogging, weightlifting, and aerobic exercise, for 6 weeks or until your doctor says it is okay. · Until your doctor says it is okay, do not lift anything heavier than your baby. · Do not do sit-ups or other exercise that strain the belly muscles for 6 weeks or until your doctor says it is okay. · Hold a pillow over your incision when you cough or take deep breaths. This will support your belly and decrease your pain. · You may shower as usual. Pat the incision dry when you are done. · You will have some vaginal bleeding. Wear sanitary pads. Do not douche or use tampons until your doctor says it is okay. · Ask your doctor when you can drive again. · You will probably need to take at least 6 weeks off work. It depends on the type of work you do and how you feel. · Wait until you are healed (about 4 to 6 weeks) before you have sexual intercourse. Your doctor will tell you when it is okay to have sex. · Talk to your doctor about birth control. You can get pregnant even before your period returns. Also, you can get pregnant while you are breast-feeding. Incision care  Your skin is your body's first line of defense against germs, but an incision site leaves an easy way for germs to enter your body. To prevent infection:  · Clean your hands frequently and before and after changing any touching any dressings. · If you have strips of tape on the incision, leave the tape on for a week or until it falls off. · Look at your incision closely every day for any changes. Contact your doctor if you experience any signs of infection, such as fever or increased redness at the surgical site. · Wash the area daily with warm, soapy water, and pat it dry. Don't use hydrogen peroxide or alcohol, which can slow healing.  You may cover the area with a gauze bandage if it weeps or rubs against clothing. Change the bandage every day. · Keep the area clean and dry. Diet  · You can eat your normal diet. If your stomach is upset, try bland, low-fat foods like plain rice, broiled chicken, toast, and yogurt. · Drink plenty of fluids (unless your doctor tells you not to). · You may notice that your bowel movements are not regular right after your surgery. This is common. Try to avoid constipation and straining with bowel movements. You may want to take a fiber supplement every day. If you have not had a bowel movement after a couple of days, ask your doctor about taking a mild laxative. · If you are breast-feeding, do not drink any alcohol. Medicines  · Take pain medicines exactly as directed. · If the doctor gave you a prescription medicine for pain, take it as prescribed. · If you are not taking a prescription pain medicine, ask your doctor if you can take an over-the-counter medicine such as acetaminophen (Tylenol), ibuprofen (Advil, Motrin), or naproxen (Aleve), for cramps. Read and follow all instructions on the label. Do not take aspirin, because it can cause more bleeding. Do not take acetaminophen (Tylenol) and other acetaminophen containing medications (i.e. Percocet) at the same time. · If you think your pain medicine is making you sick to your stomach:  · Take your medicine after meals (unless your doctor has told you not to). · Ask your doctor for a different pain medicine. · If your doctor prescribed antibiotics, take them as directed. Do not stop taking them just because you feel better. You need to take the full course of antibiotics. Mental Health  · Many women get the \"baby blues\" during the first few days after childbirth. You may lose sleep, feel irritable, and cry easily. You may feel happy one minute and sad the next. Hormone changes are one cause of these emotional changes.  Also, the demands of a new baby, along with visits from relatives or other family needs, add to a mother's stress. The \"baby blues\" often peak around the fourth day. Then they ease up in less than 2 weeks. · If your moodiness or anxiety lasts for more than 2 weeks, or if you feel like life is not worth living, you may have postpartum depression. This is different for each mother. Some mothers with serious depression may worry intensely about their infant's well-being. Others may feel distant from their child. Some mothers might even feel that they might harm their baby. A mother may have signs of paranoia, wondering if someone is watching her. · With all the changes in your life, you may not know if you are depressed. Pregnancy sometimes causes changes in how you feel that are similar to the symptoms of depression. · Symptoms of depression include:  · Feeling sad or hopeless and losing interest in daily activities. These are the most common symptoms of depression. · Sleeping too much or not enough. · Feeling tired. You may feel as if you have no energy. · Eating too much or too little. · POSTPARTUM SUPPORT INTERNATIONAL (PSI) offers a Warm line; Chat with the Expert phone sessions; Information and Articles about Pregnancy and Postpartum Mood Disorders; Comprehensive List of Free Support Groups; Knowledgeable local coordinators who will offer support, information, and resources; Guide to Resources on [place] [employer]; Calendar of events in the  mood disorders community; Latest News and Research; and [facility] [address] for [employer]. Remember - You are not alone; You are not to blame; With help, you will be well. 7-016-523-PPD(6768). WWW. POSTPARTUM. NET   · Writing or talking about death, such as writing suicide notes or talking about guns, knives, or pills. Keep the numbers for these national suicide hotlines: 0-554-043-TALK (4-260.699.3863) and 2-863-CUPDASM (5-756.599.6151).  If you or someone you know talks about suicide or feeling hopeless, get help right away. Other instructions  · If you breast-feed your baby, you may be more comfortable while you are healing if you place the baby so that he or she is not resting on your belly. Try tucking your baby under your arm, with his or her body along the side you will be feeding on. Support your baby's upper body with your arm. With that hand you can control your baby's head to bring his or her mouth to your breast. This is sometimes called the football hold. Follow-up care is a key part of your treatment and safety. Be sure to make and go to all appointments, and call your doctor if you are having problems. It's also a good idea to know your test results and keep a list of the medicines you take. When should you call for help? Call 911 anytime you think you may need emergency care. For example, call if:  · You are thinking of hurting yourself, your baby, or anyone else. · You passed out (lost consciousness). · You have symptoms of a blood clot in your lung (called a pulmonary embolism). These may include:  · Sudden chest pain. · Trouble breathing. · Coughing up blood. Call your doctor now or seek immediate medical care if:    · You have severe vaginal bleeding. · You are soaking through a pad each hour for 2 or more hours. · Your vaginal bleeding seems to be getting heavier or is still bright red 4 days after delivery. · You are dizzy or lightheaded, or you feel like you may faint. · You are vomiting or cannot keep fluids down. · You have a fever. · You have new or more belly pain. · You have loose stitches, or your incision comes open. · You have symptoms of infection, such as:  · Increased pain, swelling, warmth, or redness. · Red streaks leading from the incision. · Pus draining from the incision. · A fever  · You pass tissue (not just blood). · Your vaginal discharge smells bad. · Your belly feels tender or full and hard.   · Your breasts are continuously painful or red.  · You feel sad, anxious, or hopeless for more than a few days. · You have sudden, severe pain in your belly. · You have symptoms of a blood clot in your leg (called a deep vein thrombosis), such as:  · Pain in your calf, back of the knee, thigh, or groin. · Redness and swelling in your leg or groin. · You have symptoms of preeclampsia, such as:  · Sudden swelling of your face, hands, or feet. · New vision problems (such as dimness or blurring). · A severe headache. · Your blood pressure is higher than it should be or rises suddenly. · You have new nausea or vomiting. Watch closely for changes in your health, and be sure to contact your doctor if you have any problems. Additional Information:  Gestational Diabetes After Childbirth     Most of the time, gestational diabetes goes away after a baby is born. But if you have had gestational diabetes, you have a greater chance of having it in a future pregnancy and of developing type 2 diabetes. To check for diabetes, you may have a follow-up glucose tolerance test 6 to 12 weeks after your baby is born or after you stop breast-feeding your baby. You may be able to control your blood sugar with a healthy diet and regular exercise. Staying at a healthy weight also may keep you from getting type 2 diabetes later on. If diet and exercise do not lower your blood sugar enough, you may need to take insulin shots. Insulin is safe during pregnancy. These are general instructions for a healthy lifestyle:    No smoking/ No tobacco products/ Avoid exposure to second hand smoke    Surgeon General's Warning:  Quitting smoking now greatly reduces serious risk to your health.     Obesity, smoking, and sedentary lifestyle greatly increases your risk for illness    A healthy diet, regular physical exercise & weight monitoring are important for maintaining a healthy lifestyle    Recognize signs and symptoms of STROKE:    F-face looks uneven    A-arms unable to move or move unevenly    S-speech slurred or non-existent    T-time-call 911 as soon as signs and symptoms begin - DO NOT go       back to bed or wait to see if you get better - TIME IS BRAIN. I have had the opportunity to make my options or choices for discharge. I have received and understand these instructions.

## 2017-05-01 NOTE — PROGRESS NOTES
Post-Operative Day Number 3 Progress/Discharge Note    Patient doing well post-op day 3 from  delivery without significant complaints. Pain controlled on current medication. Voiding without difficulty, normal lochia. Vitals:  Patient Vitals in the past 8 hrs:   BP Temp Temp src Pulse Resp SpO2 Height Weight   04/18/10 0846 133/83 mmHg 97.9 °F (36.6 °C) - 74  20  - - -       Temp (24hrs), Av.6 °F (36.4 °C), Min:97.2 °F (36.2 °C), Max:97.9 °F (36.6 °C)      Vital signs stable, afebrile. Exam:  Patient without distress. Abdomen soft, fundus firm at level of umbilicus, nontender. Incision dry and                      clean without erythema. Lower extremities +edema, nt    Labs: No results found for this or any previous visit (from the past 24 hour(s)). Assessment and Plan:  Patient appears to be having uncomplicated post- course. Continue routine post-op care and maternal education. Plan discharge for today with follow up in our office in 4 weeks.

## 2017-05-16 ENCOUNTER — TELEPHONE (OUTPATIENT)
Dept: ENDOCRINOLOGY | Age: 32
End: 2017-05-16

## 2017-05-16 NOTE — TELEPHONE ENCOUNTER
Please let her know I don't have a specific nutritionist to work with aside from having her be seen at the diabetes treatment center.   If she would like to go back there, I can put in a referral.

## 2017-05-16 NOTE — TELEPHONE ENCOUNTER
Pt notified of message per Dr. Alok Banks and voiced understanding of what was read to her. She stated that she would like the referral to be submitted.

## 2017-05-16 NOTE — TELEPHONE ENCOUNTER
Patient is requesting a call back in regards to having Dr. Yasmin Salcido recommend a nutritionist for her. She can be reached at [phone]. Thanks.

## 2017-05-24 ENCOUNTER — HOSPITAL ENCOUNTER (OUTPATIENT)
Dept: DIABETES SERVICES | Age: 32
Discharge: HOME | End: 2017-05-24
Payer: COMMERCIAL

## 2017-05-24 PROCEDURE — G0108 DIAB MANAGE TRN  PER INDIV: HCPCS | Performed by: DIETITIAN, REGISTERED

## 2017-05-26 NOTE — DIABETES MGMT
5/24/17    Met with pt today for meal planing post-pregnancy, she is ~4 weeks post-partum. Pt stated she recently met with a lactation consultant who instructed her to consume 2,000 calories per day while breastfeeding. Per pt food recall she is currently consuming around 1,200 calories per day. We discussed importance of increasing calorie intake to improve milk supply. Discussed ways to help pt increase calorie intake. Pt reports that she is currently consuming 1-2 carb choices per meal, discussed aiming for at least 3 carb choices per meal in effort to increase caloric intake while still maintaining adequate BG control. Provided pt with 2,000 calorie sample meal plan as guide. Pt very proficient in carb counting and label reading. Pt stated that she plans to being tracking food intake and will bring log to f/u appt.      Plan f/u 6/8/17 weeks to review food log    Thank you,    Richie Pickett, [address] [facility]  Office: [phone]

## 2017-06-08 ENCOUNTER — HOSPITAL ENCOUNTER (OUTPATIENT)
Dept: DIABETES SERVICES | Age: 32
Discharge: HOME | End: 2017-06-08
Payer: COMMERCIAL

## 2017-06-08 DIAGNOSIS — E10.9 TYPE 1 DIABETES MELLITUS WITHOUT COMPLICATION (HCC): ICD-10-CM

## 2017-06-08 PROCEDURE — G0108 DIAB MANAGE TRN  PER INDIV: HCPCS | Performed by: DIETITIAN, REGISTERED

## 2017-06-08 NOTE — DIABETES MGMT
Dear Yudelka Escalera MD,    Your patient, Tamica Tabor, attended an individual meal planning education f/u session at [place] [address] where the following topics were covered today:      *Incorporating nutritional management into lifestyle    *Incorporating physical activity into lifestyle    *Developing personal strategies to address psychosocial issues and concerns    *Developing personal strategies to promote health and behavior change    *Monitoring blood glucose and other parametners and interpreting and using the results           for self-management decision making    *Preventing, dectecting and treating acute complications    *Using medication(s) safely and for maximum therapeutic effectiveness      Comments:  Met with pt today for meal planning f/u. Pt reported that she has been doing well with increasing snacks in between meals to try and increase calorie intake and is consuming around ~1,500 calories per day. Pt continues to consume around 1-2 carb choices for breakfast and lunch but has increased at dinner to 3 carb choices. Pt reported that she has been experiencing low BG in the mid 40's range after dinner and she typically is breastfeeding prior to or right after dinner. Chan Greer RD, CDE suggested pt increase her insulin to carb ratio (pt current ratio 1:10) to 1:11 and continue to increase until able to breastfeed without experiencing the lows, pt agreed and verbalized understanding. Also suggested having glass of milk during breastfeeding session to help reduce occurrence of lows. Pt stated that her son typically sleeps at least 6-7 hours per night as result she is supplementing with formula throughout the day to ensure he is consuming enough ounces. Discussed with pt she may want to aim for closer to 2,000 calories per day to help increase her milk supply when baby is going through a growth spurt.  Provided contact information for future questions. Data from visit:  6/8/17 - A1C 6.1%, Wt 135.8#      We look forward to assisting your patient in meeting their self-management goals. If you have any questions, please do not hesitate to call the [facility] at [phone].       Sincerely,  Lori Granado, [address] [place] [address] [address]  [place], [address]  Phone: [phone]   Fax: [phone]

## 2017-06-12 ENCOUNTER — TELEPHONE (OUTPATIENT)
Dept: ENDOCRINOLOGY | Age: 32
End: 2017-06-12

## 2017-06-12 NOTE — TELEPHONE ENCOUNTER
Patient is requesting a sooner appointment with you than her scheduled appointment in August. She stated that she has been having a lot of lows around dinner time and is concerned. She would like to see you this week if possible. Please let me know if you can see her sooner. Thank you.

## 2017-06-12 NOTE — TELEPHONE ENCOUNTER
Called and left a voicemail that I had sent her a [employer] message on 6/9/17 that she has not yet read that gave some recommendations on how to adjust her insulin at dinner to help with lows. Advised her to write back and let me know if she wants to try this first but if she would like to come for a visit on Thursday at 1:50pm instead that is fine too.

## 2017-06-13 NOTE — TELEPHONE ENCOUNTER
She wrote back to me over mychart that she would like to come on Thurs 6/15/17 at 1:50pm so please put her on the schedule for this date and time.

## 2017-06-15 ENCOUNTER — OFFICE VISIT (OUTPATIENT)
Dept: ENDOCRINOLOGY | Age: 32
End: 2017-06-15

## 2017-06-15 VITALS
HEART RATE: 89 BPM | BODY MASS INDEX: 24.53 KG/M2 | DIASTOLIC BLOOD PRESSURE: 83 MMHG | WEIGHT: 133.3 LBS | HEIGHT: 62 IN | SYSTOLIC BLOOD PRESSURE: 117 MMHG

## 2017-06-15 DIAGNOSIS — E78.5 HYPERLIPIDEMIA LDL GOAL <100: ICD-10-CM

## 2017-06-15 NOTE — PATIENT INSTRUCTIONS
1) Decrease your toujeo to 26 units at noon starting tomorrow. 2) Keep taking humalog 1:10 for breakfast and lunch and try 1:15 for dinner. 3) If you still have lows within 4 hours of dinner, then try 1:20 for dinner. If you have lows in the middle of the night, try 24 of toujeo. 4) E-mail me some more readings in the next 2-3 weeks so I can know how you are doing.

## 2017-06-15 NOTE — MR AVS SNAPSHOT
Visit Information Date & Time Provider Department Dept. Phone Encounter #  
 6/15/2017  1:50 PM Ishmael Moreno, [address] [facility] End[facility]ology [phone] [id number] Follow-up Instructions Return for as scheduled on 8/3/17. Your Appointments 8/3/2017  1:30 PM  
Follow Up with MD Janak Liriano Diabetes and Endocrinology [facility]) Appt Note: f/i   ok per Dr. Bindu Gordon [address] [id number] [address] [facility] Upcoming Health Maintenance Date Due Pneumococcal 19-64 Medium Risk (1 of 1 - PPSV23) 8/10/2006 DTaP/Tdap/Td series (1 - Tdap) 8/10/2008 PAP AKA CERVICAL CYTOLOGY 8/10/2008 LIPID PANEL Q1 3/17/2017 INFLUENZA AGE 9 TO ADULT 8/1/2017 MICROALBUMIN Q1 8/25/2017 FOOT EXAM Q1 8/29/2017 HEMOGLOBIN A1C Q6M 10/20/2017 EYE EXAM RETINAL OR DILATED Q1 3/28/2018 Allergies as of 6/15/2017  Review Complete On: 6/15/2017 By: Ishmael Moreno MD  
  
 Severity Noted Reaction Type Reactions Ciprofloxacin  06/22/2015    Hives Current Immunizations  Never Reviewed No immunizations on file. Not reviewed this visit Vitals BP Pulse Height(growth percentile) Weight(growth percentile) BMI OB Status 117/83 (BP 1 Location: Right arm) 89 5' 2\" (1.575 m) 133 lb 4.8 oz (60.5 kg) 24.38 kg/m2 Recent pregnancy Smoking Status Never Smoker Vitals History BMI and BSA Data Body Mass Index Body Surface Area  
 24.38 kg/m 2 1.63 m 2 Preferred Pharmacy Pharmacy Name Phone [id number] [phone] Your Updated Medication List  
  
   
This list is accurate as of: 6/15/17  2:41 PM.  Always use your most recent med list.  
  
  
  
  
 CALCIUM PO Take  by mouth daily. insulin lispro 100 unit/mL kwikpen Commonly known as:  HUMALOG  
1 units per 10 grams of carbs for breakfast and lunch and 1 unit for 15 grams at dinner dinner + 1 units for every 40 mg/dl above 130 mg/dl--Dose change 6/15/17--updated med list--did not send prescription to the pharmacy Insulin Needles (Disposable) 31 gauge x 3/16\" Ndle Commonly known as:  BD INSULIN PEN NEEDLE UF MINI Use as directed 4 times daily ONETOUCH ULTRA TEST strip Generic drug:  glucose blood VI test strips Test 4 times daily  Dx: E10.65 ONETOUCH ULTRAMINI monitoring kit Generic drug:  Blood-Glucose Meter Use as directed: E10.65 PRENATAL PO Take  by mouth. TOUJEO SOLOSTAR 300 unit/mL (1.5 mL) Inpn Generic drug:  insulin glargine 26 Units by SubCUTAneous route daily. Follow-up Instructions Return for as scheduled on 8/3/17. Patient Instructions 1) Decrease your toujeo to 26 units at noon starting tomorrow. 2) Keep taking humalog 1:10 for breakfast and lunch and try 1:15 for dinner. 3) If you still have lows within 4 hours of dinner, then try 1:20 for dinner. If you have lows in the middle of the night, try 24 of toujeo. 4) E-mail me some more readings in the next 2-3 weeks so I can know how you are doing. Introducing [facility]! Dear Cheikh Schaffer: Thank you for requesting a Mind[employer] account. Our records indicate that you already have an active Mindscape account. You can access your account anytime at [email or web address] Did you know that you can access your hospital and ER discharge instructions at any time in Mindscape? You can also review all of your test results from your hospital stay or ER visit. Additional Information If you have questions, please visit the Frequently Asked Questions section of the Mindscape website at [email or web address] Remember, [employer] is NOT to be used for urgent needs. For medical emergencies, dial 911. Now available from your iPhone and Android! Please provide this summary of care documentation to your next provider. Your primary care clinician is listed as [address] [place] [address]. If you have any questions after today's visit, please call [phone].

## 2017-06-15 NOTE — PROGRESS NOTES
Chief Complaint   Patient presents with    Diabetes     pcp and pharmacy confirmed    Other     patient had an A1c on June 8  ( see progress note)     History of Present Illness: Ena Griffin is a 36 y.o. female here for follow up of diabetes. She delivered her son, Elizabeth Jiménez, on 4/28/17 and is going 5-6 hours at night without feeding. She is doing a combo of breast and bottle feeding. Has been taking 30 units of toujeo at noon and dosing humalog 1:10 for carbs but has had more trouble with lows after dinner with some readings in the 40-60s and sometimes over night. Last night took 3 units of humalog for dinner with bbq sandwich and coleslaw and baked beans when her sugar was 54 before dinner and her sugar was 118 this morning. Has many excellent readings in the  range. Has lost 33 lbs since delivery and his back to pre-pregnancy weight. Current Outpatient Prescriptions   Medication Sig    insulin lispro (HUMALOG) 100 unit/mL kwikpen 1 units per 3 grams of carbs for breakfast and 1 unit for 4 grams of carbs at lunch and dinner + 1 units for every 40 mg/dl above 130 mg/dl--Dose change 3/23/17--updated med list--did not send prescription to the pharmacy    insulin glargine (TOUJEO SOLOSTAR) 300 unit/mL (1.5 mL) inpn 30 Units by SubCUTAneous route daily.  CALCIUM PO Take  by mouth daily.  PNV95/FERROUS FUMARATE/FA (PRENATAL PO) Take  by mouth.  Insulin Needles, Disposable, (BD INSULIN PEN NEEDLE UF MINI) 31 gauge x 3/16\" ndle Use as directed 4 times daily    ONETOUCH ULTRA TEST strip Test 4 times daily  Dx: E10.65    ONETOUCH ULTRAMINI monitoring kit Use as directed: E10.65     No current facility-administered medications for this visit.       Allergies   Allergen Reactions    Ciprofloxacin Hives     Review of Systems:  - Eyes: no blurry vision or double vision  - Cardiovascular: no chest pain  - Respiratory: no shortness of breath  - Musculoskeletal: no myalgias  - Neurological: no numbness/tingling in extremities    Physical Examination:  Blood pressure 117/83, pulse 89, height 5' 2\" (1.575 m), weight 133 lb 4.8 oz (60.5 kg), currently breastfeeding.  - General: pleasant, no distress, good eye contact   - Neck: no carotid bruits  - Cardiovascular: regular, normal rate, nl s1 and s2, no m/r/g,   - Respiratory: clear bilaterally  - Integumentary: no edema,   - Psychiatric: normal mood and affect    Data Reviewed:   POC A1C 6.1% on 6/8/17 at [facility]    Assessment/Plan:   1. Type I (juvenile type) diabetes mellitus without mention of complication, uncontrolled ([facility] [address]): her most recent Hgb A1c was 6.1% in 6/17 stable from 4/17 down from 6.4% in 3/17 down from 6.8% in 2/17 stable from 1/17 up from 6.6% in 12/16 down from 6.8% in 11/16 down from 7.5% in 10/16 down from 7.9% in 9/16 up from 7.6% in 8/16 down from 8.3% in 3/16 up from 7.8% in 10/15 down from 8.2% in 6/15 up from 6.8% in 3/15. I will decrease her toujeo and her dinner humalog to avoid lows after dinner and overnight. - decrease toujeo to 26 units daily  - take humalog 1:10 for breakfast and lunch but 1:15 for dinner and 1:50> 130 once she delivers  - check bs 4 times per day due to fluctuating sugars  - foot exam done 8/16  - optho UTD 6/15  - TSH normal 6/15  - microalbumin nl 8/16  - check A1c, TSH, lipids and microalbumin at next visit  - her BP was at goal < 140/90        2. Hyperlipidemia: Given DM, Goal LDL < 100, non-HDL < 130, and TG < 150.  in 6/15 down to 129 in 3/16. Will not pursue any meds while trying to conceive  - diet control  - check lipids at next visit      Patient Instructions   1) Decrease your toujeo to 26 units at noon starting tomorrow. 2) Keep taking humalog 1:10 for breakfast and lunch and try 1:15 for dinner. 3) If you still have lows within 4 hours of dinner, then try 1:20 for dinner. If you have lows in the middle of the night, try 24 of toujeo.       4) E-mail me some more readings in the next 2-3 weeks so I can know how you are doing. Follow-up Disposition:  Return for as scheduled on 8/3/17.     Copy sent to:  Dr. Carolee Daley

## 2017-08-03 ENCOUNTER — OFFICE VISIT (OUTPATIENT)
Dept: ENDOCRINOLOGY | Age: 32
End: 2017-08-03

## 2017-08-03 VITALS
DIASTOLIC BLOOD PRESSURE: 87 MMHG | HEIGHT: 62 IN | BODY MASS INDEX: 24.77 KG/M2 | HEART RATE: 109 BPM | SYSTOLIC BLOOD PRESSURE: 122 MMHG | WEIGHT: 134.6 LBS

## 2017-08-03 DIAGNOSIS — E78.5 HYPERLIPIDEMIA LDL GOAL <100: ICD-10-CM

## 2017-08-03 LAB — HBA1C MFR BLD HPLC: 6.9 %

## 2017-08-03 NOTE — PROGRESS NOTES
Chief Complaint   Patient presents with    Diabetes     History of Present Illness: Amber Ghosh is a 36 y.o. female here for follow up of diabetes. Weight up 1 lbs since last visit in 6/17. She stopped breast feeding at the beginning of July. Decreased her toujeo to 26 units daily and her dinner humalog to 1:15. Despite this is still having fasting sugars in the 60-80 range 1-2 times a week. Most of her readings are between  during the day but does have some spikes in the 200s and a few over 300 if she is not counting carbs accurately. Her son, Janece Rubinstein, is now 3 months and is sleeping through the night. Current Outpatient Prescriptions   Medication Sig    insulin lispro (HUMALOG) 100 unit/mL kwikpen 1 units per 10 grams of carbs for breakfast and lunch and 1 unit for 15 grams at dinner dinner + 1 units for every 50 mg/dl above 130 mg/dl--Dose change 6/15/17--updated med list--did not send prescription to the pharmacy    insulin glargine (TOUJEO SOLOSTAR) 300 unit/mL (1.5 mL) inpn 26 Units by SubCUTAneous route daily.  CALCIUM PO Take  by mouth daily.  PNV95/FERROUS FUMARATE/FA (PRENATAL PO) Take  by mouth.  Insulin Needles, Disposable, (BD INSULIN PEN NEEDLE UF MINI) 31 gauge x 3/16\" ndle Use as directed 4 times daily    ONETOUCH ULTRA TEST strip Test 4 times daily  Dx: E10.65    ONETOUCH ULTRAMINI monitoring kit Use as directed: E10.65     No current facility-administered medications for this visit.       Allergies   Allergen Reactions    Ciprofloxacin Hives     Review of Systems:  - Eyes: no blurry vision or double vision  - Cardiovascular: no chest pain  - Respiratory: no shortness of breath  - Musculoskeletal: no myalgias  - Neurological: no numbness/tingling in extremities    Physical Examination:  Blood pressure 122/87, pulse (!) 109, height 5' 2\" (1.575 m), weight 134 lb 9.6 oz (61.1 kg), currently breastfeeding.  - General: pleasant, no distress, good eye contact   - Neck: no carotid bruits  - Cardiovascular: regular, normal rate, nl s1 and s2, no m/r/g,   - Respiratory: clear bilaterally  - Integumentary: no edema,   - Psychiatric: normal mood and affect         Diabetic foot exam performed by Judy Larios MD     Measurement  Response Nurse Comment Physician Comment   Monofilament  R - normal sensation with micro filament  L - normal sensation with micro filament     Pulse DP R - 2+ (normal)  L - 2+ (normal)     Vibration R - normal  L - normal     Structural deformity R - None  L - None     Skin Integrity / Deformity R - Mild - callus  L - Mild - callus        Reviewed by:                 Data Reviewed:   Component      Latest Ref Rng & Units 8/3/2017           1:52 PM   Hemoglobin A1c (POC)      % 6.9       Assessment/Plan:   1. Type I (juvenile type) diabetes mellitus without mention of complication, [facility] her most recent Hgb A1c was 6.9% in 8/17 up from 6.1% in 6/17 stable from 4/17 down from 6.4% in 3/17 down from 6.8% in 2/17 stable from 1/17 up from 6.6% in 12/16 down from 6.8% in 11/16 down from 7.5% in 10/16 down from 7.9% in 9/16 up from 7.6% in 8/16 down from 8.3% in 3/16 up from 7.8% in 10/15 down from 8.2% in 6/15 up from 6.8% in 3/15. Still having some fasting hypoglycemia so will further decrease her toujeo. Need to more accurately count carbs to avoid spikes but may still need more humalog with meals so gave her titration parameters below. - decrease toujeo to 22 units daily  - take humalog 1:10 for breakfast and lunch and 1:15 for dinner and 1:50> 130 once she delivers  - check bs 4 times per day due to fluctuating sugars  - foot exam done 8/17  - optho UTD 6/15  - TSH normal 6/15  - microalbumin nl 8/16  - check A1c, TSH, lipids and microalbumin prior to next visit  - her BP was at goal < 140/90        2. Hyperlipidemia: Given DM, Goal LDL < 100, non-HDL < 130, and TG < 150.  in 6/15 down to 129 in 3/16.   Will not pursue any meds while trying to conceive  - diet control  - check lipids prior to next visit    Patient Instructions   1) Slightly decrease the toujeo to 24 units daily to see if this helps get rid of the morning lows under 80.    2) Keep your humalog the same and be sure to count carbs as accurate as possible. If you are still spiking after breakfast or lunch, try 1 unit for 8 grams of carbs and if spiking after dinner, try 1 unit for 12 grams. 3) Your Hemoglobin A1c (3 month test of blood sugar) is still good at 6.9%. 4) Please go to your local [facility] 3-4 days before your next appointment to have your labs drawn. Please fast for your labs. Don't eat anything after midnight. Follow-up Disposition:  Return in about 6 months (around 2/3/2018).     Copy sent to:  Dr. Harpreet Celaya

## 2017-08-03 NOTE — PATIENT INSTRUCTIONS
1) Slightly decrease the toujeo to 24 units daily to see if this helps get rid of the morning lows under 80.    2) Keep your humalog the same and be sure to count carbs as accurate as possible. If you are still spiking after breakfast or lunch, try 1 unit for 8 grams of carbs and if spiking after dinner, try 1 unit for 12 grams. 3) Your Hemoglobin A1c (3 month test of blood sugar) is still good at 6.9%. 4) Please go to your local [facility] 3-4 days before your next appointment to have your labs drawn. Please fast for your labs. Don't eat anything after midnight.

## 2017-08-03 NOTE — MR AVS SNAPSHOT
Visit Information Date & Time Provider Department Dept. Phone Encounter #  
 8/3/2017  1:30 PM Cassidy Christian, [address] [place] [address] Diabetes and Endocrinology 21  Follow-up Instructions Return in about 6 months (around 2/3/2018). Upcoming Health Maintenance Date Due Pneumococcal 19-64 Medium Risk (1 of 1 - PPSV23) 8/10/2006 DTaP/Tdap/Td series (1 - Tdap) 8/10/2008 PAP AKA CERVICAL CYTOLOGY 8/10/2008 LIPID PANEL Q1 3/17/2017 INFLUENZA AGE 9 TO ADULT 8/1/2017 MICROALBUMIN Q1 8/25/2017 FOOT EXAM Q1 8/29/2017 HEMOGLOBIN A1C Q6M 10/20/2017 EYE EXAM RETINAL OR DILATED Q1 3/28/2018 Allergies as of 8/3/2017  Review Complete On: 8/3/2017 By: Cassidy Christian MD  
  
 Severity Noted Reaction Type Reactions Ciprofloxacin  06/22/2015    Hives Current Immunizations  Never Reviewed No immunizations on file. Not reviewed this visit You Were Diagnosed With   
  
 Codes Comments Uncontrolled type 1 diabetes mellitus without complication (HCC)    -  Primary ICD-10-CM: E10.9 ICD-9-CM: 250.03 Hyperlipidemia LDL goal <100     ICD-10-CM: E78.5 ICD-9-CM: 272.4 Vitals BP Pulse Height(growth percentile) Weight(growth percentile) BMI OB Status 122/87 (BP 1 Location: Left arm) (!) 109 5' 2\" (1.575 m) 134 lb 9.6 oz (61.1 kg) 24.62 kg/m2 Recent pregnancy Smoking Status Never Smoker BMI and BSA Data Body Mass Index Body Surface Area  
 24.62 kg/m 2 1.63 m 2 Preferred Pharmacy Pharmacy Name Phone [id number], [place] [phone] Your Updated Medication List  
  
   
This list is accurate as of: 8/3/17  2:07 PM.  Always use your most recent med list.  
  
  
  
  
 CALCIUM PO Take  by mouth daily. insulin glargine 300 unit/mL (1.5 mL) Inpn Commonly known as:  Ramone Hernandez  
 24 Units by SubCUTAneous route daily. Dose change 8/3/17--updated med list--did not send prescription to the pharmacy  
  
 insulin lispro 100 unit/mL kwikpen Commonly known as:  HUMALOG  
1 units per 10 grams of carbs for breakfast and lunch and 1 unit for 15 grams at dinner dinner + 1 units for every 50 mg/dl above 130 mg/dl--Dose change 6/15/17--updated med list--did not send prescription to the pharmacy Insulin Needles (Disposable) 31 gauge x 3/16\" Ndle Commonly known as:  BD INSULIN PEN NEEDLE UF MINI Use as directed 4 times daily ONETOUCH ULTRA TEST strip Generic drug:  glucose blood VI test strips Test 4 times daily  Dx: E10.65 ONETOUCH ULTRAMINI monitoring kit Generic drug:  Blood-Glucose Meter Use as directed: E10.65 PRENATAL PO Take  by mouth. We Performed the Following AMB POC HEMOGLOBIN A1C [76297 CPT(R)] Follow-up Instructions Return in about 6 months (around 2/3/2018). To-Do List   
 01/03/2018 Lab:  HEMOGLOBIN A1C WITH EAG   
  
 01/03/2018 Lab:  LIPID PANEL   
  
 01/03/2018 Lab:  METABOLIC PANEL, COMPREHENSIVE   
  
 01/03/2018 Lab:  MICROALBUMIN, UR, RAND W/ MICROALBUMIN/CREA RATIO Patient Instructions 1) Slightly decrease the toujeo to 24 units daily to see if this helps get rid of the morning lows under 80. 
 
2) Keep your humalog the same and be sure to count carbs as accurate as possible. If you are still spiking after breakfast or lunch, try 1 unit for 8 grams of carbs and if spiking after dinner, try 1 unit for 12 grams. 3) Your Hemoglobin A1c (3 month test of blood sugar) is still good at 6.9%. 4) Please go to your local [facility] 3-4 days before your next appointment to have your labs drawn. Please fast for your labs. Don't eat anything after midnight. Introducing [facility]! [employer]: Thank you for requesting a [employer] account.   Our records indicate that you already have an active [employer] account. You can access your account anytime at [email or web address] Did you know that you can access your hospital and ER discharge instructions at any time in [employer]? You can also review all of your test results from your hospital stay or ER visit. Additional Information If you have questions, please visit the Frequently Asked Questions section of the [employer] website at [email or web address] Remember, [employer] is NOT to be used for urgent needs. For medical emergencies, dial 911. Now available from your iPhone and Android! Please provide this summary of care documentation to your next provider. Your primary care clinician is listed as [facility]. If you have any questions after today's visit, please call [phone].

## 2017-08-21 RX ORDER — BLOOD SUGAR DIAGNOSTIC
STRIP MISCELLANEOUS
Qty: 400 STRIP | Refills: 0 | Status: SHIPPED | OUTPATIENT
Start: 2017-08-21 | End: 2017-11-20 | Stop reason: SDUPTHER

## 2017-08-21 RX ORDER — PEN NEEDLE, DIABETIC 31 GX3/16"
NEEDLE, DISPOSABLE MISCELLANEOUS
Qty: 400 PEN NEEDLE | Refills: 0 | Status: SHIPPED | OUTPATIENT
Start: 2017-08-21 | End: 2017-11-20 | Stop reason: SDUPTHER

## 2017-11-16 NOTE — IP AVS SNAPSHOT
Summary of Care Report The Summary of Care report has been created to help improve care coordination. Users with access to [place] or [address] (Web-based application) may access additional patient information including the Discharge Summary. If you are not currently a [address] user and need more information, please call the number listed below in the [place] [address] section and ask to be connected with Medical Records. Facility Information Name Address Phone [address] [id number] [phone] Patient Information Patient Name Sex  Mirtha Crockett ([id number]) Female 1985 Discharge Information Admitting Provider Service Area Unit Shani Cantu MD / [phone] [address] [facility] 3w Mother Infant / [phone] Discharge Provider Discharge Date/Time Discharge Disposition Destination (none) 2017 Morning (Pending) AHR (none) Patient Language Language ENGLISH [13] Hospital Problems as of 2017  Reviewed: 2017  2:58 PM by Tiny Abarca MD  
  
  
  
 Class Noted - Resolved Last Modified POA Active Problems Premature rupture of membranes in pregnancy  2017 - Present 2017 by Shani Cantu MD Unknown Entered by Shani Cantu MD  
  Labor abnormal  2017 - Present 2017 by Shani Cantu MD Unknown Entered by Shani Cantu MD  
  
Non-Hospital Problems as of 2017  Reviewed: 2017  2:58 PM by Tiny Abarca MD  
  
  
  
 Class Noted - Resolved Last Modified Active Problems Uncontrolled type 1 diabetes mellitus ([facility] [address]  Unknown - Present 10/5/2015 by Andrew Dexter Entered by Tiny Abarca MD  
  Overview Signed 2015  9:12 AM by Tiny Abarca MD  
   went into DKA on 2 occasions Hyperlipidemia LDL goal <100  11/2/2015 - Present 3/22/2016 by Wilberto Borja MD  
  Entered by Wilberto Borja MD  
  
You are allergic to the following Allergen Reactions Ciprofloxacin Hives Current Discharge Medication List  
  
START taking these medications Dose & Instructions Dispensing Information Comments  
 hydroCHLOROthiazide 25 mg tablet Commonly known as:  HYDRODIURIL Dose:  25 mg Take 1 Tab by mouth two (2) times daily as needed. Quantity:  60 Tab Refills:  0  
   
 ibuprofen 800 mg tablet Commonly known as:  MOTRIN Dose:  800 mg Take 1 Tab by mouth every eight (8) hours as needed for Pain. Quantity:  40 Tab Refills:  5  
   
 oxyCODONE-acetaminophen 5-325 mg per tablet Commonly known as:  PERCOCET Dose:  1 Tab Take 1 Tab by mouth every four (4) hours as needed for Pain. Max Daily Amount: 6 Tabs. Quantity:  30 Tab Refills:  0 ASK your doctor about these medications Dose & Instructions Dispensing Information Comments CALCIUM PO Take  by mouth daily. Refills:  0  
   
 insulin lispro 100 unit/mL kwikpen Commonly known as:  HUMALOG  
 1 units per 3 grams of carbs for breakfast and 1 unit for 4 grams of carbs at lunch and dinner + 1 units for every 40 mg/dl above 130 mg/dl--Dose change 3/23/17--updated med list--did not send prescription to the pharmacy Refills:  0 Insulin Needles (Disposable) 31 gauge x 3/16\" Ndle Commonly known as:  BD INSULIN PEN NEEDLE UF MINI Use as directed 4 times daily Quantity:  400 Pen Needle Refills:  3  
   
 ONETOUCH ULTRA TEST strip Generic drug:  glucose blood VI test strips Test 4 times daily  Dx: E10.65 Quantity:  400 Strip Refills:  3  
   
 ONETOUCH ULTRAMINI monitoring kit Generic drug:  Blood-Glucose Meter Use as directed: E10.65 Quantity:  1 Kit Refills:  0 PRENATAL PO Take  by mouth. Refills:  0 TOUJEO SOLOSTAR 300 unit/mL (1.5 mL) Inpn Generic drug:  insulin glargine Dose:  54 Units 54 Units by SubCUTAneous route daily. And Increase as directed up to a max of 70 units per day--Dose change 3/7/17-updated med list--did not send prescription to the pharmacy Quantity:  6 Adjustable Dose Pre-filled Pen Syringe Refills:  11  
   
  
  
  
Surgery Information ID Date/Time Status Primary Surgeon All Procedures Location [id number] 2017 Unposted   ZZANESTHESIA [facility] - DO NOT SCHEDULE    
 [id number] 2017 Posted Dean Duong MD  SECTION [facility] L&D OR Follow-up Information Follow up With Details Comments Contact Info Jerica Vizcarra MD   [address] [place] [address] 623 Estrella Gayle MD 
[address] [place] [address] 
[phone] [facility] PLACE Call As needed for breastfeeding questions or concerns. [address], [address] 
[phone] Dave Figueroa MD   [address] [place] [phone] Discharge Instructions POSTPARTUM DISCHARGE INSTRUCTIONS Name:  Tab Diaz YOB: 1985 Admission Diagnosis:  Pregnancy Premature rupture of membranes in pregnancy Labor abnormal    
Discharge Diagnosis:   
Problem List as of 2017  Date Reviewed: 2017 Codes Class Noted - Resolved Premature rupture of membranes in pregnancy ICD-10-CM: O42.90 ICD-9-CM: 658.10  2017 - Present Labor abnormal ICD-10-CM: O62.9 ICD-9-CM: 661.90  2017 - Present Hyperlipidemia LDL goal <100 ICD-10-CM: E78.5 ICD-9-CM: 272.4  2015 - Present Uncontrolled type 1 diabetes [facility] ([facility] ICD-10-CM: E10.65 ICD-9-CM: 250.03  Unknown - Present Overview Signed 2015  9:12 AM by Ishmael Moreno MD  
  went into DKA on 2 occasions Attending Physician:  Belkis Sen MD 
 
 Delivery Type:   Section: What to Expect at [facility] Your Recovery: A  section, or , is surgery to deliver your baby through a cut, called an incision that the doctor makes in your lower belly and uterus. You may have some pain in your lower belly and need pain medicine for 1 to 2 weeks. You can expect some vaginal bleeding for several weeks. You will probably need about 6 weeks to fully recover. It is important to take it easy while the incision is healing. Avoid heavy lifting, strenuous activities, or exercises that strain the belly muscles while you are recovering. Ask a family member or friend for help with housework, cooking, and shopping. This care sheet gives you a general idea about how long it will take for you to recover. But each person recovers at a different pace. Follow the steps below to get better as quickly as possible. How can you care for yourself at home? Activity · Rest when you feel tired. Getting enough sleep will help you recover. · Try to walk each day. Start by walking a little more than you did the day before. Bit by bit, increase the amount you walk. Walking boosts blood flow and helps prevent pneumonia, constipation, and blood clots. · Avoid strenuous activities, such as bicycle riding, jogging, weightlifting, and aerobic exercise, for 6 weeks or until your doctor says it is okay. · Until your doctor says it is okay, do not lift anything heavier than your baby. · Do not do sit-ups or other exercise that strain the belly muscles for 6 weeks or until your doctor says it is okay. · Hold a pillow over your incision when you cough or take deep breaths. This will support your belly and decrease your pain. · You may shower as usual. Pat the incision dry when you are done. · You will have some vaginal bleeding. Wear sanitary pads. Do not douche or use tampons until your doctor says it is okay. · Ask your doctor when you can drive again. · You will probably need to take at least 6 weeks off work. It depends on the type of work you do and how you feel. · Wait until you are healed (about 4 to 6 weeks) before you have sexual intercourse. Your doctor will tell you when it is okay to have sex. · Talk to your doctor about birth control. You can get pregnant even before your period returns. Also, you can get pregnant while you are breast-feeding. Incision care Your skin is your body's first line of defense against germs, but an incision site leaves an easy way for germs to enter your body. To prevent infection: · Clean your hands frequently and before and after changing any touching any dressings. · If you have strips of tape on the incision, leave the tape on for a week or until it falls off. · Look at your incision closely every day for any changes. Contact your doctor if you experience any signs of infection, such as fever or increased redness at the surgical site. · Wash the area daily with warm, soapy water, and pat it dry. Don't use hydrogen peroxide or alcohol, which can slow healing. You may cover the area with a gauze bandage if it weeps or rubs against clothing. Change the bandage every day. · Keep the area clean and dry. Diet · You can eat your normal diet. If your stomach is upset, try bland, low-fat foods like plain rice, broiled chicken, toast, and yogurt. · Drink plenty of fluids (unless your doctor tells you not to). · You may notice that your bowel movements are not regular right after your surgery. This is common. Try to avoid constipation and straining with bowel movements. You may want to take a fiber supplement every day. If you have not had a bowel movement after a couple of days, ask your doctor about taking a mild laxative. · If you are breast-feeding, do not drink any alcohol. Medicines · Take pain medicines exactly as directed.  
· If the doctor gave you a prescription medicine for pain, take it as prescribed. · If you are not taking a prescription pain medicine, ask your doctor if you can take an over-the-counter medicine such as acetaminophen (Tylenol), ibuprofen (Advil, Motrin), or naproxen (Aleve), for cramps. Read and follow all instructions on the label. Do not take aspirin, because it can cause more bleeding. Do not take acetaminophen (Tylenol) and other acetaminophen containing medications (i.e. Percocet) at the same time. · If you think your pain medicine is making you sick to your stomach: 
· Take your medicine after meals (unless your doctor has told you not to). · Ask your doctor for a different pain medicine. · If your doctor prescribed antibiotics, take them as directed. Do not stop taking them just because you feel better. You need to take the full course of antibiotics. Mental Health · Many women get the \"baby blues\" during the first few days after childbirth. You may lose sleep, feel irritable, and cry easily. You may feel happy one minute and sad the next. Hormone changes are one cause of these emotional changes. Also, the demands of a new baby, along with visits from relatives or other family needs, add to a mother's stress. The \"baby blues\" often peak around the fourth day. Then they ease up in less than 2 weeks. · If your moodiness or anxiety lasts for more than 2 weeks, or if you feel like life is not worth living, you may have postpartum depression. This is different for each mother. Some mothers with serious depression may worry intensely about their infant's well-being. Others may feel distant from their child. Some mothers might even feel that they might harm their baby. A mother may have signs of paranoia, wondering if someone is watching her. · With all the changes in your life, you may not know if you are depressed. Pregnancy sometimes causes changes in how you feel that are similar to the symptoms of depression. · Symptoms of depression include: · Feeling sad or hopeless and losing interest in daily activities. These are the most common symptoms of depression. · Sleeping too much or not enough. · Feeling tired. You may feel as if you have no energy. · Eating too much or too little. · POSTPARTUM SUPPORT INTERNATIONAL (PSI) offers a Warm line; Chat with the Expert phone sessions; Information and Articles about Pregnancy and Postpartum Mood Disorders; Comprehensive List of Free Support Groups; Knowledgeable local coordinators who will offer support, information, and resources; Guide to Resources on [employer]; Calendar of events in the  mood disorders community; Latest News and Research; and [facility] [address] for [employer]. Remember - You are not alone; You are not to blame; With help, you will be well. 8-755-118-PPD(8819). WWW. POSTPARTUM. NET · Writing or talking about death, such as writing suicide notes or talking about guns, knives, or pills. Keep the numbers for these national suicide hotlines: 5-211-075-TALK (9-750.257.4979) and 4-197-TYYFEUO (9-750.970.6930). If you or someone you know talks about suicide or feeling hopeless, get help right away. Other instructions · If you breast-feed your baby, you may be more comfortable while you are healing if you place the baby so that he or she is not resting on your belly. Try tucking your baby under your arm, with his or her body along the side you will be feeding on. Support your baby's upper body with your arm. With that hand you can control your baby's head to bring his or her mouth to your breast. This is sometimes called the football hold. Follow-up care is a key part of your treatment and safety. Be sure to make and go to all appointments, and call your doctor if you are having problems. It's also a good idea to know your test results and keep a list of the medicines you take. When should you call for help? Call 911 anytime you think you may need emergency care. For example, call if: 
· You are thinking of hurting yourself, your baby, or anyone else. · You passed out (lost consciousness). · You have symptoms of a blood clot in your lung (called a pulmonary embolism). These may include: 
· Sudden chest pain. · Trouble breathing. · Coughing up blood. Call your doctor now or seek immediate medical care if: 
 
· You have severe vaginal bleeding. · You are soaking through a pad each hour for 2 or more hours. · Your vaginal bleeding seems to be getting heavier or is still bright red 4 days after delivery. · You are dizzy or lightheaded, or you feel like you may faint. · You are vomiting or cannot keep fluids down. · You have a fever. · You have new or more belly pain. · You have loose stitches, or your incision comes open. · You have symptoms of infection, such as: 
· Increased pain, swelling, warmth, or redness. · Red streaks leading from the incision. · Pus draining from the incision. · A fever · You pass tissue (not just blood). · Your vaginal discharge smells bad. · Your belly feels tender or full and hard. · Your breasts are continuously painful or red. · You feel sad, anxious, or hopeless for more than a few days. · You have sudden, severe pain in your belly. · You have symptoms of a blood clot in your leg (called a deep vein thrombosis), such as: 
· Pain in your calf, back of the knee, thigh, or groin. · Redness and swelling in your leg or groin. · You have symptoms of preeclampsia, such as: 
· Sudden swelling of your face, hands, or feet. · New vision problems (such as dimness or blurring). · A severe headache. · Your blood pressure is higher than it should be or rises suddenly. · You have new nausea or vomiting. Watch closely for changes in your health, and be sure to contact your doctor if you have any problems. Additional Information:  Gestational Diabetes After Childbirth Most of the time, gestational diabetes goes away after a baby is born. But if you have had gestational diabetes, you have a greater chance of having it in a future pregnancy and of developing type 2 diabetes. To check for diabetes, you may have a follow-up glucose tolerance test 6 to 12 weeks after your baby is born or after you stop breast-feeding your baby. You may be able to control your blood sugar with a healthy diet and regular exercise. Staying at a healthy weight also may keep you from getting type 2 diabetes later on. If diet and exercise do not lower your blood sugar enough, you may need to take insulin shots. Insulin is safe during pregnancy. These are general instructions for a healthy lifestyle: No smoking/ No tobacco products/ Avoid exposure to second hand smoke Surgeon General's Warning:  Quitting smoking now greatly reduces serious risk to your health. Obesity, smoking, and sedentary lifestyle greatly increases your risk for illness A healthy diet, regular physical exercise & weight monitoring are important for maintaining a healthy lifestyle Recognize signs and symptoms of STROKE: 
 
F-face looks uneven A-arms unable to move or move unevenly S-speech slurred or non-existent T-time-call 911 as soon as signs and symptoms begin - DO NOT go  
    back to bed or wait to see if you get better - TIME IS BRAIN. I have had the opportunity to make my options or choices for discharge. I have received and understand these instructions. Chart Review Routing History Recipient Method Report Sent By Adalid Mckay MD  
Fax: [phone] Phone: [phone] Fax Note Review Gisele Schneider [[id number]] 6/24/2015  8:24 AM 06/23/2015 Amber Sams MD  
Fax: [phone] Phone: [phone] Fax Note Review Gisele Schneider [[id number]] 6/24/2015  8:24 AM 06/23/2015 Ryan Villalta MD  
Fax: [phone] Phone: [phone] Fax Note Review Andrew Gillis [[id number]] 11/3/2015  8:18 AM 11/02/2015 Hardeep Woods MD  
Fax: [phone] Phone: [phone] Fax Note Review Andrew Gillis [[id number]] 11/3/2015  8:18 AM 11/02/2015 Ryan Villalta MD  
Fax: [phone] Phone: [phone] Fax Note Review Andrew Gillis [[id number]] 3/23/2016  9:35 AM 03/22/2016 Hardeep Woods MD  
Fax: [phone] Phone: [phone] Fax Note Review Andrew Gillis [[id number]] 3/23/2016  9:35 AM 03/22/2016 Livia Gallegos MD  
Fax: [phone] Phone: [phone] Fax Note Review Andrew Gillis [[id number]] 3/23/2016  9:35 AM 03/22/2016 Ryan Villalta MD  
Fax: [phone] Phone: [phone] Fax Notes Report Luke Cisneros MD [[id number]] 8/29/2016  7:19 PM 8/29/2016 Livia Gallegos MD  
Fax: [phone] Phone: [phone] Fax Notes Report Luke Cisneros MD [[id number]] 8/29/2016  7:19 PM 8/29/2016 Arelis Sanchez NP Fax: [phone] Phone: [phone] Fax Notes Report Luke Cisneros MD [[id number]] 8/30/2016 10:53 AM 8/29/2016 Ryan Villalta MD  
Fax: [phone] Phone: [phone] Fax Notes Report Luke Cisneros MD [[id number]] 9/29/2016  4:53 PM 9/29/2016 Livia Gallegos MD  
Fax: [phone] Phone: [phone] Fax Notes Report Luke Cisneros MD [[id number]] 9/29/2016  4:53 PM 9/29/2016 Lisa Nelson MD  
Fax: [phone] Phone: [phone] Fax Notes Report Luke Cisneros MD [[id number]] 9/29/2016  4:53 PM 9/29/2016 Ryan Villalta MD  
Fax: [phone] Phone: [phone] Fax Notes Report Luke Cisneros MD [[id number]] 10/27/2016  2:16 PM 10/27/2016 Lisa Nelson MD  
Fax: [phone] Phone: [phone] Fax Notes Report Luke Cisneros MD [[id number]] 10/27/2016  2:16 PM 10/27/2016 Ryan Villalta MD  
Fax: [phone] Phone: [phone] Fax Notes Report Melody Matute MD [[id number]] 12/1/2016  2:16 PM 12/1/2016 Mauricio Edwards MD  
Fax: [phone] Phone: [phone] Fax Notes Report Melody Matute MD [[id number]] 12/1/2016  2:16 PM 12/1/2016 Marisa Short MD  
Fax: [phone] Phone: [phone] Fax Notes Report Melody Matute MD [[id number]] 12/1/2016  2:16 PM 12/1/2016 Miguel A Damon MD  
Fax: [phone] Phone: [phone] Fax Notes Report Melody Matute MD [[id number]] 12/29/2016  3:22 PM 12/29/2016 Mauricio Edwards MD  
Fax: [phone] Phone: [phone] Fax Notes Report Melody Matute MD [[id number]] 12/29/2016  3:22 PM 12/29/2016 Marisa Short MD  
Fax: [phone] Phone: [phone] Fax Notes Report Melody Matute MD [[id number]] 12/29/2016  3:22 PM 12/29/2016 Miguel A Damon MD  
Fax: [phone] Phone: [phone] Fax Notes Report Melody Matute MD [[id number]] 1/26/2017  3:25 PM 1/26/2017 Marisa Short MD  
Fax: [phone] Phone: [phone] Fax Notes Report Melody Matute MD [[id number]] 1/26/2017  3:25 PM 1/26/2017 Mauricio Edwards MD  
Fax: [phone] Phone: [phone] Fax Notes Report Melody Matute MD [[id number]] 1/26/2017  3:25 PM 1/26/2017 Miguel A Damon MD  
Fax: [phone] Phone: [phone] Fax Notes Report Melody Matute MD [[id number]] 2/23/2017 11:14 AM 2/23/2017 Mauricio Edwards MD  
Fax: [phone] Phone: [phone] Fax Notes Report Melody Matute MD [[id number]] 2/23/2017 11:14 AM 2/23/2017 Marisa Short MD  
Fax: [phone] Phone: [phone] Fax Notes Report Melody Matute MD [[id number]] 2/23/2017 11:14 AM 2/23/2017 Miguel A Damon MD  
Fax: [phone] Phone: [phone] Fax Notes Report Melody Matute MD [[id number]] 3/23/2017 11:43 AM 3/23/2017 Mauricio Edwards MD  
Fax: [phone] Phone: [phone]  Fax Notes Report Melody Matute MD [[id number]] 3/23/2017 11:43 AM 3/23/2017 Vincent Bonilla MD  
Fax: [phone] Phone: [phone] Fax Notes Report Caroline Kim MD [[id number]] 3/23/2017 11:43 AM 3/23/2017 Amber Sams MD  
Fax: [phone] Phone: [phone] Fax Notes Report Caroline Kim MD [[id number]] 4/20/2017  3:18 PM 4/20/2017 Sudha Costello MD  
Fax: [phone] Phone: [phone] Fax Notes Report Caroline Kim MD [[id number]] 4/20/2017  3:18 PM 4/20/2017 Vincent Bonilla MD  
Fax: [phone] Phone: [phone] Fax Notes Report Caroline Kim MD [[id number]] 4/20/2017  3:18 PM 4/20/2017 Amber Sams MD  
Fax: [phone] Phone: [phone] Fax Nighat Sung MD NOTES AUTO ROUTING REPORT Gautam May  [phone] 4/28/2017  7:15 AM 04/28/2017 Statement Selected

## 2017-11-20 RX ORDER — BLOOD SUGAR DIAGNOSTIC
STRIP MISCELLANEOUS
Qty: 400 STRIP | Refills: 0 | Status: SHIPPED | OUTPATIENT
Start: 2017-11-20 | End: 2018-02-19 | Stop reason: SDUPTHER

## 2017-11-20 RX ORDER — PEN NEEDLE, DIABETIC 31 GX5/16"
NEEDLE, DISPOSABLE MISCELLANEOUS
Qty: 360 PEN NEEDLE | Refills: 0 | Status: SHIPPED | OUTPATIENT
Start: 2017-11-20 | End: 2018-02-19 | Stop reason: SDUPTHER

## 2018-01-31 DIAGNOSIS — E78.5 HYPERLIPIDEMIA LDL GOAL <100: ICD-10-CM

## 2018-02-01 LAB
ALBUMIN SERPL-MCNC: 4.5 G/DL (ref 3.5–5.5)
ALBUMIN/CREAT UR: <5.3 MG/G CREAT (ref 0–30)
ALBUMIN/GLOB SERPL: 2 {RATIO} (ref 1.2–2.2)
ALP SERPL-CCNC: 116 IU/L (ref 39–117)
ALT SERPL-CCNC: 44 IU/L (ref 0–32)
AST SERPL-CCNC: 62 IU/L (ref 0–40)
BILIRUB SERPL-MCNC: 1.7 MG/DL (ref 0–1.2)
BUN SERPL-MCNC: 12 MG/DL (ref 6–20)
BUN/CREAT SERPL: 16 (ref 9–23)
CALCIUM SERPL-MCNC: 9.5 MG/DL (ref 8.7–10.2)
CHLORIDE SERPL-SCNC: 94 MMOL/L (ref 96–106)
CHOLEST SERPL-MCNC: 260 MG/DL (ref 100–199)
CO2 SERPL-SCNC: 22 MMOL/L (ref 18–29)
CREAT SERPL-MCNC: 0.77 MG/DL (ref 0.57–1)
CREAT UR-MCNC: 56.6 MG/DL
EST. AVERAGE GLUCOSE BLD GHB EST-MCNC: 189 MG/DL
GFR SERPLBLD CREATININE-BSD FMLA CKD-EPI: 104 ML/MIN/1.73
GFR SERPLBLD CREATININE-BSD FMLA CKD-EPI: 120 ML/MIN/1.73
GLOBULIN SER CALC-MCNC: 2.3 G/DL (ref 1.5–4.5)
GLUCOSE SERPL-MCNC: 162 MG/DL (ref 65–99)
HBA1C MFR BLD: 8.2 % (ref 4.8–5.6)
HDLC SERPL-MCNC: 67 MG/DL
INTERPRETATION, 910389: NORMAL
LDLC SERPL CALC-MCNC: 161 MG/DL (ref 0–99)
Lab: NORMAL
MICROALBUMIN UR-MCNC: <3 UG/ML
POTASSIUM SERPL-SCNC: 4.5 MMOL/L (ref 3.5–5.2)
PROT SERPL-MCNC: 6.8 G/DL (ref 6–8.5)
SODIUM SERPL-SCNC: 136 MMOL/L (ref 134–144)
TRIGL SERPL-MCNC: 162 MG/DL (ref 0–149)
VLDLC SERPL CALC-MCNC: 32 MG/DL (ref 5–40)

## 2018-02-05 ENCOUNTER — OFFICE VISIT (OUTPATIENT)
Dept: ENDOCRINOLOGY | Age: 33
End: 2018-02-05

## 2018-02-05 VITALS
BODY MASS INDEX: 26.17 KG/M2 | SYSTOLIC BLOOD PRESSURE: 122 MMHG | DIASTOLIC BLOOD PRESSURE: 91 MMHG | HEART RATE: 108 BPM | WEIGHT: 142.2 LBS | HEIGHT: 62 IN

## 2018-02-05 DIAGNOSIS — R79.89 ELEVATED LFTS: ICD-10-CM

## 2018-02-05 DIAGNOSIS — E78.5 HYPERLIPIDEMIA LDL GOAL <100: ICD-10-CM

## 2018-02-05 RX ORDER — INSULIN LISPRO 100 [IU]/ML
INJECTION, SOLUTION INTRAVENOUS; SUBCUTANEOUS
Qty: 1 PACKAGE | COMMUNITY
Start: 2018-02-05

## 2018-02-05 NOTE — PROGRESS NOTES
Chief Complaint   Patient presents with    Diabetes     pcp and pharmacy confirmed     History of Present Illness: Bessy Allison is a 29 y.o. female here for follow up of diabetes. Weight up 8 lbs since last visit in 8/17. Her son, Caroline Zimmer, is 6 months old. Had decreased her toujeo to 22 units after last visit but around October, was starting to have higher sugars in the morning and went up to 24 units and then over the past few months went to 26 units and about 3 weeks ago went to 28 units. Has been doing 1:10 for humalog for all meals. Has been eating more smart ones for lunch and may try to work on her meal planning. Her meter shows many readings in the 180-250 range and doesn't tend to eat breakfast but does have coffee and not taking any humalog for this and may be spiking with this. At this time not using any birth control but also not trying to get pregnant. Current Outpatient Prescriptions   Medication Sig    insulin glargine (TOUJEO SOLOSTAR) 300 unit/mL (1.5 mL) inpn 28 Units by SubCUTAneous route daily. Dose change 2/5/18--updated med list--did not send prescription to the pharmacy    ONETOUCH ULTRA TEST strip TEST FOUR TIMES A DAY    BD INSULIN PEN NEEDLE UF MINI 31 gauge x 3/16\" ndle USE AS DIRECTED FOUR TIMES A DAY    insulin lispro (HUMALOG) 100 unit/mL kwikpen 1 units per 10 grams of carbs for breakfast and lunch and 1 unit for 15 grams at dinner dinner + 1 units for every 50 mg/dl above 130 mg/dl--Dose change 6/15/17--updated med list--did not send prescription to the pharmacy    Ilichova 50 monitoring kit Use as directed: E10.65     No current facility-administered medications for this visit.       Allergies   Allergen Reactions    Ciprofloxacin Hives     Review of Systems:  - Eyes: no blurry vision or double vision  - Cardiovascular: no chest pain  - Respiratory: no shortness of breath  - Musculoskeletal: no myalgias  - Neurological: no numbness/tingling in extremities    Physical Examination:  Blood pressure (!) 122/91, pulse (!) 108, height 5' 2\" (1.575 m), weight 142 lb 3.2 oz (64.5 kg), currently breastfeeding.  - General: pleasant, no distress, good eye contact   - Neck: no carotid bruits  - Cardiovascular: regular, normal rate, nl s1 and s2, no m/r/g,   - Respiratory: clear bilaterally  - Integumentary: no edema,   - Psychiatric: normal mood and affect    Data Reviewed:   Component      Latest Ref Rng & Units 1/31/2018 1/31/2018 1/31/2018 1/31/2018           8:16 AM  8:16 AM  8:16 AM  8:16 AM   Glucose      65 - 99 mg/dL    162 (H)   BUN      6 - 20 mg/dL    12   Creatinine      0.57 - 1.00 mg/dL    0.77   GFR est non-AA      >59 mL/min/1.73    104   GFR est AA      >59 mL/min/1.73    120   BUN/Creatinine ratio      9 - 23    16   Sodium      134 - 144 mmol/L    136   Potassium      3.5 - 5.2 mmol/L    4.5   Chloride      96 - 106 mmol/L    94 (L)   CO2      18 - 29 mmol/L    22   Calcium      8.7 - 10.2 mg/dL    9.5   Protein, total      6.0 - 8.5 g/dL    6.8   Albumin      3.5 - 5.5 g/dL    4.5   GLOBULIN, TOTAL      1.5 - 4.5 g/dL    2.3   A-G Ratio      1.2 - 2.2    2.0   Bilirubin, total      0.0 - 1.2 mg/dL    1.7 (H)   Alk. phosphatase      39 - 117 IU/L    116   AST      0 - 40 IU/L    62 (H)   ALT (SGPT)      0 - 32 IU/L    44 (H)   Cholesterol, total      100 - 199 mg/dL   260 (H)    Triglyceride      0 - 149 mg/dL   162 (H)    HDL Cholesterol      >39 mg/dL   67    VLDL, calculated      5 - 40 mg/dL   32    LDL, calculated      0 - 99 mg/dL   161 (H)    Creatinine, urine      Not Estab. mg/dL  56.6     Microalbumin, urine      Not Estab. ug/mL  <3.0     Microalbumin/Creat. Ratio      0.0 - 30.0 mg/g creat  <5.3     Hemoglobin A1c, (calculated)      4.8 - 5.6 % 8.2 (H)      Estimated average glucose      mg/dL 189          Assessment/Plan:     1.  Type I (juvenile type) diabetes mellitus without mention of complication, uncontrolled ([facility] her most recent Hgb A1c was 8.2% in 1/18 up from 6.9% in 8/17 up from 6.1% in 6/17 stable from 4/17 down from 6.4% in 3/17 down from 6.8% in 2/17 stable from 1/17 up from 6.6% in 12/16 down from 6.8% in 11/16 down from 7.5% in 10/16 down from 7.9% in 9/16 up from 7.6% in 8/16 down from 8.3% in 3/16 up from 7.8% in 10/15 down from 8.2% in 6/15 up from 6.8% in 3/15. Her A1c is higher due to being off track with her diet so will work on this and will start sending me readings to help with accountability. Will increase her toujeo and humalog for now. - increase toujeo to 30 units daily  - take humalog 1:6 for meals and 1:50> 130 once she delivers  - check bs 4 times per day due to fluctuating sugars  - foot exam done 8/17  - optho UTD 3/17  - TSH normal 6/15  - microalbumin nl 1/18  - check A1c and cmp prior to next visit  - her BP was at goal < 140/90        2. Hyperlipidemia: Given DM, Goal LDL < 100, non-HDL < 130, and TG < 150.  in 6/15 down to 129 in 3/16. Up to 161 in 1/18 due to diet. Will hold on statin while still in childbearing years. - diet control  - check lipids prior to next visit    3. Elevated LFTs: ALT/AST up to 44/62 in 1/18 with 8 lb wt gain so will focus on portion control to help with this. - follow on cmp      Patient Instructions   1) Take 30 units of toujeo daily. 2) Try dosing your humalog at 1 unit for 6 grams of carbs and try not to eat more than 45 grams per meal as best as possible which would give you about 8 units for meals. 3) If you are watch your diet more closely and starting to have low sugars under 80 in the morning, then try cutting back to 28 units of toujeo first and if still having lows, then try 1:7 or 1:8 for carbs. 4) Get back into writing down your sugars and insulin intake and food log and e-mail it to me in 7-10 days and I'll write back to you. 5) Your LDL (bad cholesterol) has risen but we will not use any meds for cholesterol.   Try to limit the amount of fried foods, fatty foods, butter, gravy, red meat, ice cream, cheese and eggs in your diet, which are all high in cholesterol. 6) ALT and AST are markers of liver function. Your values are slightly abnormal likely elevated due to fatty deposition in the liver that can occur with weight gain. Hopefully with weight loss, these values will return to normal and we'll repeat them with your next set of labs. Follow-up Disposition:  Return in about 4 months (around 6/5/2018).     Copy sent to:  Dr. Memo Moraes

## 2018-02-05 NOTE — MR AVS SNAPSHOT
[address] [facility] [address] [id number] [phone] Patient: Sanna Greer MRN: [id number] FIC:5/63/2071 Visit Information Date & Time Provider Department Dept. Phone Encounter #  
 2/5/2018  1:30 PM Kareen Jaffe, [address] [place] [address] Diabetes and Endocrinology [phone] [id number] Follow-up Instructions Return in about 4 months (around 6/5/2018). Upcoming Health Maintenance Date Due Pneumococcal 19-64 Medium Risk (1 of 1 - PPSV23) 8/10/2006 DTaP/Tdap/Td series (1 - Tdap) 8/10/2008 PAP AKA CERVICAL CYTOLOGY 8/10/2008 Influenza Age 5 to Adult 8/1/2017 EYE EXAM RETINAL OR DILATED Q1 3/28/2018 HEMOGLOBIN A1C Q6M 7/31/2018 FOOT EXAM Q1 8/3/2018 MICROALBUMIN Q1 1/31/2019 LIPID PANEL Q1 1/31/2019 Allergies as of 2/5/2018  Review Complete On: 2/5/2018 By: Kareen Jaffe MD  
  
 Severity Noted Reaction Type Reactions Ciprofloxacin  06/22/2015    Hives Current Immunizations  Never Reviewed No immunizations on file. Not reviewed this visit You Were Diagnosed With   
  
 Codes Comments Uncontrolled type 1 diabetes mellitus without complication ([facility]    -  Primary ICD-10-CM: E10.65 ICD-9-CM: 250.03 Hyperlipidemia LDL goal <100     ICD-10-CM: E78.5 ICD-9-CM: 272.4 Elevated LFTs     ICD-10-CM: R79.89 ICD-9-CM: 790.6 Vitals BP Pulse Height(growth percentile) Weight(growth percentile) BMI OB Status (!) 122/91 (!) 108 5' 2\" (1.575 m) 142 lb 3.2 oz (64.5 kg) 26.01 kg/m2 Recent pregnancy Smoking Status Never Smoker Vitals History BMI and BSA Data Body Mass Index Body Surface Area 26.01 kg/m 2 1.68 m 2 Preferred Pharmacy Pharmacy Name Phone [address] [facility] [phone] Your Updated Medication List  
  
   
 This list is accurate as of: 2/5/18  1:57 PM.  Always use your most recent med list.  
  
  
  
  
 BD INSULIN PEN NEEDLE UF MINI 31 gauge x 3/16\" Ndle Generic drug:  Insulin Needles (Disposable) USE AS DIRECTED FOUR TIMES A DAY  
  
 insulin glargine 300 unit/mL (1.5 mL) Inpn Commonly known as:  TOUJEO SOLOSTAR  
30 Units by SubCUTAneous route daily. Dose change 2/5/18--updated med list--did not send prescription to the pharmacy  
  
 insulin lispro 100 unit/mL kwikpen Commonly known as:  HUMALOG  
1 units per 6 grams of carbs + 1 units for every 50 mg/dl above 130 mg/dl--Dose change 2/5/18--updated med list--did not send prescription to the pharmacy ONETOUCH ULTRA TEST strip Generic drug:  glucose blood VI test strips TEST FOUR TIMES A DAY  
  
 ONETOUCH ULTRAMINI monitoring kit Generic drug:  Blood-Glucose Meter Use as directed: E10.65 We Performed the Following HEMOGLOBIN A1C WITH EAG [59701 CPT(R)] LIPID PANEL [09833 CPT(R)] METABOLIC PANEL, COMPREHENSIVE [17686 CPT(R)] Follow-up Instructions Return in about 4 months (around 6/5/2018). Patient Instructions 1) Take 30 units of toujeo daily. 2) Try dosing your humalog at 1 unit for 6 grams of carbs and try not to eat more than 45 grams per meal as best as possible which would give you about 8 units for meals. 3) If you are watch your diet more closely and starting to have low sugars under 80 in the morning, then try cutting back to 28 units of toujeo first and if still having lows, then try 1:7 or 1:8 for carbs. 4) Get back into writing down your sugars and insulin intake and food log and e-mail it to me in 7-10 days and I'll write back to you. 5) Your LDL (bad cholesterol) has risen but we will not use any meds for cholesterol. Try to limit the amount of fried foods, fatty foods, butter, gravy, red meat, ice cream, cheese and eggs in your diet, which are all high in cholesterol. 6) ALT and AST are markers of liver function. Your values are slightly abnormal likely elevated due to fatty deposition in the liver that can occur with weight gain. Hopefully with weight loss, these values will return to normal and we'll repeat them with your next set of labs. Introducing [facility]! Dear Shira Bryant: Thank you for requesting a [employer] account. Our records indicate that you already have an active [employer] account. You can access your account anytime at [email or web address] Did you know that you can access your hospital and ER discharge instructions at any time in [employer]? You can also review all of your test results from your hospital stay or ER visit. Additional Information If you have questions, please visit the Frequently Asked Questions section of the [employer] website at [email or web address] Remember, [employer] is NOT to be used for urgent needs. For medical emergencies, dial 911. Now available from your iPhone and Android! Please provide this summary of care documentation to your next provider. Your primary care clinician is listed as [facility]. If you have any questions after today's visit, please call [phone].

## 2018-02-05 NOTE — PATIENT INSTRUCTIONS
1) Take 30 units of toujeo daily. 2) Try dosing your humalog at 1 unit for 6 grams of carbs and try not to eat more than 45 grams per meal as best as possible which would give you about 8 units for meals. 3) If you are watch your diet more closely and starting to have low sugars under 80 in the morning, then try cutting back to 28 units of toujeo first and if still having lows, then try 1:7 or 1:8 for carbs. 4) Get back into writing down your sugars and insulin intake and food log and e-mail it to me in 7-10 days and I'll write back to you. 5) Your LDL (bad cholesterol) has risen but we will not use any meds for cholesterol. Try to limit the amount of fried foods, fatty foods, butter, gravy, red meat, ice cream, cheese and eggs in your diet, which are all high in cholesterol. 6) ALT and AST are markers of liver function. Your values are slightly abnormal likely elevated due to fatty deposition in the liver that can occur with weight gain. Hopefully with weight loss, these values will return to normal and we'll repeat them with your next set of labs.

## 2018-02-19 RX ORDER — BLOOD SUGAR DIAGNOSTIC
STRIP MISCELLANEOUS
Qty: 400 STRIP | Refills: 3 | Status: SHIPPED | OUTPATIENT
Start: 2018-02-19 | End: 2019-02-17 | Stop reason: SDUPTHER

## 2018-02-19 RX ORDER — PEN NEEDLE, DIABETIC 31 GX5/16"
NEEDLE, DISPOSABLE MISCELLANEOUS
Qty: 400 PEN NEEDLE | Refills: 3 | Status: SHIPPED | OUTPATIENT
Start: 2018-02-19 | End: 2019-02-17 | Stop reason: SDUPTHER

## 2018-06-04 ENCOUNTER — TELEPHONE (OUTPATIENT)
Dept: ENDOCRINOLOGY | Age: 33
End: 2018-06-04

## 2018-06-04 NOTE — TELEPHONE ENCOUNTER
Called and spoke with patient, she stated that she would need an afternoon appointment around 3pm if possible.

## 2018-06-04 NOTE — TELEPHONE ENCOUNTER
Patient called in to reschedule her appointment with you for tomorrow. She stated that she has a conflict and is not able to make it. I saw that you had an opening on Thursday the 7th but she stated that she cannot come at all this week. She would like to be seen sooner thank next available if possible. Please advise, thank you.

## 2018-06-26 ENCOUNTER — TELEPHONE (OUTPATIENT)
Dept: ENDOCRINOLOGY | Age: 33
End: 2018-06-26

## 2018-06-26 ENCOUNTER — OFFICE VISIT (OUTPATIENT)
Dept: ENDOCRINOLOGY | Age: 33
End: 2018-06-26

## 2018-06-26 NOTE — PROGRESS NOTES
Patient left without being seen as I was running an hour behind.   Rescheduled for 6/28/18 at 2:30pm

## 2018-06-26 NOTE — TELEPHONE ENCOUNTER
Called and spoke with pt. I apologized that I was running an hour behind and she had to leave without being seen. I asked her if she is willing to be seen this Thursday 6/28/18 at 2:30pm and she said she can come then. I asked her to try and do her labs tomorrow but if she can't, we can do them at her visit. she voiced understanding of this plan.

## 2018-06-28 ENCOUNTER — TELEPHONE (OUTPATIENT)
Dept: ENDOCRINOLOGY | Age: 33
End: 2018-06-28

## 2018-06-28 ENCOUNTER — OFFICE VISIT (OUTPATIENT)
Dept: ENDOCRINOLOGY | Age: 33
End: 2018-06-28

## 2018-06-28 VITALS
DIASTOLIC BLOOD PRESSURE: 78 MMHG | HEIGHT: 62 IN | WEIGHT: 146.2 LBS | BODY MASS INDEX: 26.91 KG/M2 | SYSTOLIC BLOOD PRESSURE: 120 MMHG | HEART RATE: 111 BPM

## 2018-06-28 DIAGNOSIS — R79.89 ELEVATED LFTS: ICD-10-CM

## 2018-06-28 DIAGNOSIS — E28.2 PCOS (POLYCYSTIC OVARIAN SYNDROME): ICD-10-CM

## 2018-06-28 DIAGNOSIS — E78.5 HYPERLIPIDEMIA LDL GOAL <100: ICD-10-CM

## 2018-06-28 LAB
ALBUMIN SERPL-MCNC: 4.6 G/DL (ref 3.5–5.5)
ALBUMIN/GLOB SERPL: 1.9 {RATIO} (ref 1.2–2.2)
ALP SERPL-CCNC: 132 IU/L (ref 39–117)
ALT SERPL-CCNC: 52 IU/L (ref 0–32)
AST SERPL-CCNC: 112 IU/L (ref 0–40)
BILIRUB SERPL-MCNC: 1.1 MG/DL (ref 0–1.2)
BUN SERPL-MCNC: 8 MG/DL (ref 6–20)
BUN/CREAT SERPL: 10 (ref 9–23)
CALCIUM SERPL-MCNC: 9.1 MG/DL (ref 8.7–10.2)
CHLORIDE SERPL-SCNC: 100 MMOL/L (ref 96–106)
CHOLEST SERPL-MCNC: 257 MG/DL (ref 100–199)
CO2 SERPL-SCNC: 23 MMOL/L (ref 20–29)
CREAT SERPL-MCNC: 0.77 MG/DL (ref 0.57–1)
EST. AVERAGE GLUCOSE BLD GHB EST-MCNC: 174 MG/DL
GLOBULIN SER CALC-MCNC: 2.4 G/DL (ref 1.5–4.5)
GLUCOSE SERPL-MCNC: 101 MG/DL (ref 65–99)
HBA1C MFR BLD: 7.7 % (ref 4.8–5.6)
HDLC SERPL-MCNC: 70 MG/DL
INTERPRETATION, 910389: NORMAL
LDLC SERPL CALC-MCNC: 161 MG/DL (ref 0–99)
Lab: NORMAL
POTASSIUM SERPL-SCNC: 4.4 MMOL/L (ref 3.5–5.2)
PROT SERPL-MCNC: 7 G/DL (ref 6–8.5)
SODIUM SERPL-SCNC: 141 MMOL/L (ref 134–144)
TRIGL SERPL-MCNC: 128 MG/DL (ref 0–149)
VLDLC SERPL CALC-MCNC: 26 MG/DL (ref 5–40)

## 2018-06-28 RX ORDER — METFORMIN HYDROCHLORIDE 500 MG/1
TABLET, EXTENDED RELEASE ORAL
Qty: 60 TAB | Refills: 11 | Status: SHIPPED | OUTPATIENT
Start: 2018-06-28

## 2018-06-28 RX ORDER — ALPRAZOLAM 0.5 MG/1
0.5 TABLET ORAL DAILY
Refills: 5 | COMMUNITY
Start: 2018-06-04

## 2018-06-28 NOTE — MR AVS SNAPSHOT
[address] [facility] [address] [id number] [phone] Patient: Ghassan Santana MRN: [id number] UOA:[id number] Visit Information Date & Time Provider Department Dept. Phone Encounter #  
 6/28/2018  2:30 PM Harman Henley, [address] [place] [address] Diabetes and Endocrinology [phone] [id number] Follow-up Instructions Return in about 5 months (around 11/28/2018). Upcoming Health Maintenance Date Due Pneumococcal 19-64 Medium Risk (1 of 1 - PPSV23) 8/10/2006 DTaP/Tdap/Td series (1 - Tdap) 8/10/2008 PAP AKA CERVICAL CYTOLOGY 8/10/2008 EYE EXAM RETINAL OR DILATED Q1 3/28/2018 Influenza Age 5 to Adult 8/1/2018 FOOT EXAM Q1 8/3/2018 HEMOGLOBIN A1C Q6M 12/27/2018 MICROALBUMIN Q1 1/31/2019 LIPID PANEL Q1 6/27/2019 Allergies as of 6/28/2018  Review Complete On: 6/28/2018 By: Harman Henley MD  
  
 Severity Noted Reaction Type Reactions Ciprofloxacin  06/22/2015    Hives Current Immunizations  Never Reviewed No immunizations on file. Not reviewed this visit You Were Diagnosed With   
  
 Codes Comments Uncontrolled type 1 diabetes mellitus without complication ([facility]    -  Primary ICD-10-CM: E10.65 ICD-9-CM: 250.03 Hyperlipidemia LDL goal <100     ICD-10-CM: E78.5 ICD-9-CM: 272.4 Elevated LFTs     ICD-10-CM: R79.89 ICD-9-CM: 790.6 PCOS (polycystic ovarian syndrome)     ICD-10-CM: E28.2 ICD-9-CM: 256.4 Vitals BP Pulse Height(growth percentile) Weight(growth percentile) BMI OB Status 120/78 (!) 111 5' 2\" (1.575 m) 146 lb 3.2 oz (66.3 kg) 26.74 kg/m2 Recent pregnancy Smoking Status Never Smoker Vitals History BMI and BSA Data Body Mass Index Body Surface Area  
 26.74 kg/m 2 1.7 m 2 Preferred Pharmacy Pharmacy Name Phone  [id number] [place] [phone] Your Updated Medication List  
  
   
This list is accurate as of 6/28/18  3:20 PM.  Always use your most recent med list.  
  
  
  
  
 ALPRAZolam 0.5 mg tablet Commonly known as:  Catalina Stai Take 0.5 mg by mouth daily. BD ULTRA-FINE MINI PEN NEEDLE 31 gauge x 3/16\" Ndle Generic drug:  Insulin Needles (Disposable) USE AS DIRECTED FOUR TIMES A DAY  
  
 insulin glargine 300 unit/mL (1.5 mL) Inpn Commonly known as:  TOUJEO SOLOSTAR U-300 INSULIN Inject 30 Units by Subcutaneous route daily  
  
 insulin lispro 100 unit/mL kwikpen Commonly known as:  HUMALOG  
1 units per 6 grams of carbs + 1 units for every 50 mg/dl above 130 mg/dl--Dose change 2/5/18--updated med list--did not send prescription to the pharmacy  
  
 metFORMIN  mg tablet Commonly known as:  GLUCOPHAGE XR Take 1 tab with dinner x 1-2 weeks. If tolerating, increase to 2 tabs at dinner ONETOUCH ULTRA TEST strip Generic drug:  glucose blood VI test strips TEST 4 TIMES A DAY  
  
 ONETOUCH ULTRAMINI monitoring kit Generic drug:  Blood-Glucose Meter Use as directed: E10.65 Prescriptions Sent to Pharmacy Refills  
 metFORMIN ER (GLUCOPHAGE XR) 500 mg tablet 11 Sig: Take 1 tab with dinner x 1-2 weeks. If tolerating, increase to 2 tabs at dinner Class: Normal  
 Pharmacy: [address] [place] [address] #: [phone] We Performed the Following HEMOGLOBIN A1C WITH EAG [99118 CPT(R)] HEPATIC FUNCTION PANEL [88963 CPT(R)] LIPID PANEL [30656 CPT(R)] METABOLIC PANEL, COMPREHENSIVE [99857 CPT(R)] MICROALBUMIN, UR, RAND W/ MICROALB/CREAT RATIO [id number] CPT(R)] TESTOSTERONE, TOTAL, FEMALE/CHILD [id number] CPT(R)] Follow-up Instructions Return in about 5 months (around 11/28/2018). Patient Instructions 1) In the future if you are sick, increase the toujeo to 36 units and if still over 175, plan on using 39 units during the duration of the illness and then go back to 30 units. 2) Do your best to cut back on the carbs to 45 grams or less and try 1:6 for humalog. If you are having low sugars under 90, let me know. 3) We will repeat your liver tests in 2 weeks. Don't drink any alcohol for the next 2 weeks. 4) Begin Metformin  mg with dinner or right after dinner and take for 1-2 weeks. If no GI side effects (nausea, diarrhea, belly pain), go up to 1000 mg = 2 tabs at dinner. You can split the dose to 1 tab twice daily if you would like. If at any point you cannot tolerate the dose, go back to one tab daily. This will be ready for  at the pharmacy today. 5) Please call to make an appointment with the eye doctor sometime in the next few months. 6) I will add on a testosterone level to your labs from yesterday and e-mail you the result to have a baseline for PCOS. Introducing [facility]! Dear Leslie Live: Thank you for requesting a [employer] account. Our records indicate that you already have an active [employer] account. You can access your account anytime at [email or web address] Did you know that you can access your hospital and ER discharge instructions at any time in [employer]? You can also review all of your test results from your hospital stay or ER visit. Additional Information If you have questions, please visit the Frequently Asked Questions section of the [employer] website at [email or web address] Remember, [employer] is NOT to be used for urgent needs. For medical emergencies, dial 911. Now available from your iPhone and Android! Please provide this summary of care documentation to your next provider. Your primary care clinician is listed as [facility].  If you have any questions after today's visit, please call [phone].

## 2018-06-28 NOTE — PATIENT INSTRUCTIONS
1) In the future if you are sick, increase the toujeo to 36 units and if still over 175, plan on using 39 units during the duration of the illness and then go back to 30 units. 2) Do your best to cut back on the carbs to 45 grams or less and try 1:6 for humalog. If you are having low sugars under 90, let me know. 3) We will repeat your liver tests in 2 weeks. Don't drink any alcohol for the next 2 weeks. 4) Begin Metformin  mg with dinner or right after dinner and take for 1-2 weeks. If no GI side effects (nausea, diarrhea, belly pain), go up to 1000 mg = 2 tabs at dinner. You can split the dose to 1 tab twice daily if you would like. If at any point you cannot tolerate the dose, go back to one tab daily. This will be ready for  at the pharmacy today. 5) Please call to make an appointment with the eye doctor sometime in the next few months. 6) I will add on a testosterone level to your labs from yesterday and e-mail you the result to have a baseline for PCOS.

## 2018-06-28 NOTE — PROGRESS NOTES
Chief Complaint   Patient presents with    Diabetes     PCP and Pharmacy verified     History of Present Illness: Indio Abbott is a 29 y.o. female here for follow up of diabetes. Weight up 4 lbs since last visit in 2/18. About 3-4 weeks ago, her son developed a respiratory infection and she got this also and was taking dayquil and nyquil multiple doses during the day over 2 week period but none in the past 10-14 days. Did have about 3-4 mixed drinks 4 days prior to the lab draw. No abd pain or n/v/d. Elizabeth Meza is now 12 months old and is walking. Currently taking toujeo 30 units at noon and humalog 1:7 for carbs and 1:50 > 130 for correction. Sugars are mostly 130-280 and did have some over 300 when sick. No lows. Still tends to eat over 45 grams of carbs with meals and may be underestimating her carbs as she had spaghetti and bread last night and only took 10 units though her sugar was 161 prior to dinner. She has had more separation anxiety recently and takes xanax for this. She states she was told she had PCOS a few years ago and this was why she had a hard time getting pregnant. Was given metformin in 2009 when she was first diagnosed with DM but came off this when she went into DKA and never took this again for PCOS but is willing to try. Hasn't had any periods since her son was born and is not breast feeding. Has a rogue hair on her chin that she has to pluck but not too much trouble with acne. Current Outpatient Prescriptions   Medication Sig    ALPRAZolam (XANAX) 0.5 mg tablet Take 0.5 mg by mouth daily.     insulin glargine (TOUJEO SOLOSTAR U-300 INSULIN) 300 unit/mL (1.5 mL) inpn Inject 30 Units by Subcutaneous route daily    BD INSULIN PEN NEEDLE UF MINI 31 gauge x 3/16\" ndle USE AS DIRECTED FOUR TIMES A DAY    ONETOUCH ULTRA TEST strip TEST 4 TIMES A DAY    insulin lispro (HUMALOG) 100 unit/mL kwikpen 1 units per 6 grams of carbs + 1 units for every 50 mg/dl above 130 mg/dl--Dose change 2/5/18--updated med list--did not send prescription to the pharmacy    [employer] 50 monitoring kit Use as directed: E10.65     No current facility-administered medications for this visit. Allergies   Allergen Reactions    Ciprofloxacin Hives     Review of Systems:  - Eyes: no blurry vision or double vision  - Cardiovascular: no chest pain  - Respiratory: no shortness of breath  - Musculoskeletal: no myalgias  - Neurological: no numbness/tingling in extremities    Physical Examination:  Blood pressure 120/78, pulse (!) 111, height 5' 2\" (1.575 m), weight 146 lb 3.2 oz (66.3 kg), currently breastfeeding.  - General: pleasant, no distress, good eye contact   - Neck: no carotid bruits  - Cardiovascular: regular, normal rate, nl s1 and s2, no m/r/g,   - Respiratory: clear bilaterally  - Integumentary: no edema,   - Psychiatric: normal mood and affect    Data Reviewed:   Component      Latest Ref Rng & Units 6/27/2018 6/27/2018 6/27/2018          10:14 AM 10:14 AM 10:14 AM   Glucose      65 - 99 mg/dL  101 (H)    BUN      6 - 20 mg/dL  8    Creatinine      0.57 - 1.00 mg/dL  0.77    GFR est non-AA      >59 mL/min/1.73  104    GFR est AA      >59 mL/min/1.73  120    BUN/Creatinine ratio      9 - 23  10    Sodium      134 - 144 mmol/L  141    Potassium      3.5 - 5.2 mmol/L  4.4    Chloride      96 - 106 mmol/L  100    CO2      20 - 29 mmol/L  23    Calcium      8.7 - 10.2 mg/dL  9.1    Protein, total      6.0 - 8.5 g/dL  7.0    Albumin      3.5 - 5.5 g/dL  4.6    GLOBULIN, TOTAL      1.5 - 4.5 g/dL  2.4    A-G Ratio      1.2 - 2.2  1.9    Bilirubin, total      0.0 - 1.2 mg/dL  1.1    Alk.  phosphatase      39 - 117 IU/L  132 (H)    AST      0 - 40 IU/L  112 (H)    ALT (SGPT)      0 - 32 IU/L  52 (H)    Cholesterol, total      100 - 199 mg/dL 257 (H)     Triglyceride      0 - 149 mg/dL 128     HDL Cholesterol      >39 mg/dL 70     VLDL, calculated      5 - 40 mg/dL 26     LDL, calculated      0 - 99 mg/dL 161 (H)     Creatinine, urine      Not Estab. mg/dL      Microalbumin, urine      Not Estab. ug/mL      Microalbumin/Creat. Ratio      0.0 - 30.0 mg/g creat      Hemoglobin A1c, (calculated)      4.8 - 5.6 %   7.7 (H)   Estimated average glucose      mg/dL   174       Assessment/Plan:     1. Type I (juvenile type) diabetes mellitus without mention of complication, uncontrolled [facility] her most recent Hgb A1c was 7.7% in 6/18 down from 8.2% in 1/18 up from 6.9% in 8/17 up from 6.1% in 6/17 stable from 4/17 down from 6.4% in 3/17 down from 6.8% in 2/17 stable from 1/17 up from 6.6% in 12/16 down from 6.8% in 11/16 down from 7.5% in 10/16 down from 7.9% in 9/16 up from 7.6% in 8/16 down from 8.3% in 3/16 up from 7.8% in 10/15 down from 8.2% in 6/15 up from 6.8% in 3/15. Her A1c is coming down but would benefit from a more aggressive carb ratio and also using metformin for PCOS should help with insulin resistance. - cont toujeo 30 units daily  - take humalog 1:6 for meals and 1:50> 130 once she delivers  - check bs 4 times per day due to fluctuating sugars  - foot exam done 8/17  - optho UTD 3/17  - TSH normal 6/15  - microalbumin nl 1/18  - check A1c and cmp and microalbumin prior to next visit  - her BP was at goal < 140/90        2. Hyperlipidemia: Given DM, Goal LDL < 100, non-HDL < 130, and TG < 150.  in 6/15 down to 129 in 3/16. Up to 161 in 1/18 due to diet and still 161 in 6/18. Will hold on statin while still in childbearing years. - diet control  - check lipids prior to next visit    3. Elevated LFTs: ALT/AST up to 44/62 in 1/18 with 8 lb wt gain and up to 52/112 in 6/18 possibly due to tylenol in her cold meds and also ETOH so will avoid both the next 2 weeks and repeat labs. - check hepatic panel in 2 weeks  - follow on cmp    4. PCOS: Will add on a baseline T level to labs drawn yesterday and start metformin to treat this.   - begin Metformin  mg at dinner and titrate to 2 tabs daily as tolerated   - check total T prior to next visit            Patient Instructions   1) In the future if you are sick, increase the toujeo to 36 units and if still over 175, plan on using 39 units during the duration of the illness and then go back to 30 units. 2) Do your best to cut back on the carbs to 45 grams or less and try 1:6 for humalog. If you are having low sugars under 90, let me know. 3) We will repeat your liver tests in 2 weeks. Don't drink any alcohol for the next 2 weeks. 4) Begin Metformin  mg with dinner or right after dinner and take for 1-2 weeks. If no GI side effects (nausea, diarrhea, belly pain), go up to 1000 mg = 2 tabs at dinner. You can split the dose to 1 tab twice daily if you would like. If at any point you cannot tolerate the dose, go back to one tab daily. This will be ready for  at the pharmacy today. 5) Please call to make an appointment with the eye doctor sometime in the next few months. 6) I will add on a testosterone level to your labs from yesterday and e-mail you the result to have a baseline for PCOS. Follow-up Disposition:  Return in about 5 months (around 11/28/2018). Copy sent to:  Dr. Al Todd    Lab follow up: 7/5/18  Component      Latest Ref Rng & Units 6/27/2018          10:14 AM   Testosterone, Serum (Total)      10.0 - 55.0 ng/dL 53.7     Sent her the following message through [employer]:  Your testosterone level is 53 which is high and fits with PCOS. Goal is under 30. Hopefully you will be able to tolerate the metformin to help lower this level and treat your PCOS and improve your diabetes control.

## 2018-06-29 LAB — SPECIMEN STATUS REPORT, ROLRST: NORMAL

## 2018-07-03 LAB
SPECIMEN STATUS REPORT, ROLRST: NORMAL
TESTOST SERPL-MCNC: 53.7 NG/DL (ref 10–55)

## 2019-02-17 DIAGNOSIS — E10.65 UNCONTROLLED TYPE 1 DIABETES MELLITUS WITH HYPERGLYCEMIA (HCC): Primary | ICD-10-CM

## 2019-02-18 RX ORDER — PEN NEEDLE, DIABETIC 31 GX3/16"
NEEDLE, DISPOSABLE MISCELLANEOUS
Qty: 400 PEN NEEDLE | Refills: 0 | Status: SHIPPED | OUTPATIENT
Start: 2019-02-18 | End: 2019-05-20 | Stop reason: SDUPTHER

## 2019-04-27 ENCOUNTER — TELEPHONE (OUTPATIENT)
Dept: ENDOCRINOLOGY | Age: 34
End: 2019-04-27

## 2019-04-27 NOTE — LETTER
5/20/2019 5:27 PM 
 
Ms. Jayleen Faustin [address] [phone] You have not been seen since June 2018 and cancelled your appointment in November and never rescheduled. I approved a 1 month supply of your toujeo, strips and pen needles with no refills. If you would like to receive further refills then you will need to be seen in the next few months months. Please call the office to schedule this appointment. Otherwise you can receive future refills from your PCP. If you have any questions, please don't hesitate to call me at [phone].  
 
Sincerely, 
 
 
 
Bruna Waldrop MD

## 2019-04-27 NOTE — TELEPHONE ENCOUNTER
Please call her and find out if she plans on coming back to see me. I have not seen her since June 2018 and she cancelled her appointment in November and never rescheduled. I gave her one more 30 day refill of toujeo but won't continue to refill this unless she schedules a follow up visit. Otherwise she can receive refills from her PCP.

## 2019-05-20 DIAGNOSIS — E10.65 UNCONTROLLED TYPE 1 DIABETES MELLITUS WITH HYPERGLYCEMIA (HCC): ICD-10-CM

## 2019-05-20 RX ORDER — PEN NEEDLE, DIABETIC 31 GX3/16"
NEEDLE, DISPOSABLE MISCELLANEOUS
Qty: 100 PEN NEEDLE | Refills: 0 | Status: SHIPPED | OUTPATIENT
Start: 2019-05-20

## 2021-10-14 NOTE — TELEPHONE ENCOUNTER
Patient called to ask for a refill on her One Touch Ultra Test Strips and the Ultra Fine Pen Needles. She uses [facility] so will need a 90-day supply. Patient can be reached at:  [phone].       [facility] [phone]  One Touch Ultra Test Strips    (90-day supply)  Ultra Fine Pen Needles      (90-day supply)
3

## (undated) DEVICE — SOLUTION IV 1000ML 0.9% SOD CHL

## (undated) DEVICE — CATH FOLEY 16F LUBRI-SIL IC --

## (undated) DEVICE — POOLE SUCTION INSTRUMENT WITH REMOVABLE SHEATH: Brand: POOLE

## (undated) DEVICE — SUTURE VCRL SZ 3-0 L36IN ABSRB VLT CT L40MM 1/2 CIR J356H

## (undated) DEVICE — 3000CC GUARDIAN II: Brand: GUARDIAN

## (undated) DEVICE — COVERALL PREM SMS 2XL KNIT --

## (undated) DEVICE — REM POLYHESIVE ADULT PATIENT RETURN ELECTRODE: Brand: VALLEYLAB

## (undated) DEVICE — (D)PREP SKN CHLRAPRP APPL 26ML -- CONVERT TO ITEM 371833

## (undated) DEVICE — SOLUTION IRRIG 1000ML H2O STRL BLT

## (undated) DEVICE — LIGHT HANDLE: Brand: DEVON

## (undated) DEVICE — ROYALSILK SURGICAL GOWN, L: Brand: CONVERTORS

## (undated) DEVICE — KENDALL SCD EXPRESS SLEEVES, KNEE LENGTH, MEDIUM: Brand: KENDALL SCD

## (undated) DEVICE — DEVON™ KNEE AND BODY STRAP 60" X 3" (1.5 M X 7.6 CM): Brand: DEVON

## (undated) DEVICE — DRAPE FLD WRM W44XL66IN C6L FOR INTRATEMP SYS THERMABASIN

## (undated) DEVICE — PACK PROCEDURE SURG C SECT KT SMH

## (undated) DEVICE — SUTURE MCRYL SZ 4-0 L27IN ABSRB UD L24MM PS-1 3/8 CIR PRIM Y935H

## (undated) DEVICE — STERILE POLYISOPRENE POWDER-FREE SURGICAL GLOVES: Brand: PROTEXIS

## (undated) DEVICE — STERILE POLYISOPRENE POWDER-FREE SURGICAL GLOVES WITH EMOLLIENT COATING: Brand: PROTEXIS

## (undated) DEVICE — Device: Brand: PORTEX

## (undated) DEVICE — SOLIDIFIER MEDC 1200ML -- CONVERT TO 356117

## (undated) DEVICE — SUTURE VCRL SZ 0 L36IN ABSRB VLT L40MM CT 1/2 CIR J358H